# Patient Record
Sex: MALE | Race: OTHER | Employment: FULL TIME | URBAN - METROPOLITAN AREA
[De-identification: names, ages, dates, MRNs, and addresses within clinical notes are randomized per-mention and may not be internally consistent; named-entity substitution may affect disease eponyms.]

---

## 2018-09-19 ENCOUNTER — HOSPITAL ENCOUNTER (OUTPATIENT)
Dept: DIABETES SERVICES | Age: 58
Discharge: HOME | End: 2018-09-19
Payer: COMMERCIAL

## 2018-09-19 DIAGNOSIS — E11.9 DIABETES MELLITUS WITHOUT COMPLICATION (HCC): ICD-10-CM

## 2018-09-19 PROCEDURE — G0109 DIAB MANAGE TRN IND/GROUP: HCPCS

## 2018-10-02 ENCOUNTER — HOSPITAL ENCOUNTER (OUTPATIENT)
Dept: DIABETES SERVICES | Age: 58
Discharge: HOME | End: 2018-10-02
Payer: COMMERCIAL

## 2018-10-02 DIAGNOSIS — E11.9 DIABETES MELLITUS WITHOUT COMPLICATION (HCC): ICD-10-CM

## 2018-10-02 PROCEDURE — G0109 DIAB MANAGE TRN IND/GROUP: HCPCS | Performed by: DIETITIAN, REGISTERED

## 2018-11-15 ENCOUNTER — HOSPITAL ENCOUNTER (OUTPATIENT)
Dept: DIABETES SERVICES | Age: 58
Discharge: HOME | End: 2018-11-15
Payer: COMMERCIAL

## 2018-11-15 DIAGNOSIS — E11.9 DIABETES MELLITUS WITHOUT COMPLICATION (HCC): ICD-10-CM

## 2018-11-15 PROCEDURE — G0109 DIAB MANAGE TRN IND/GROUP: HCPCS

## 2019-04-04 ENCOUNTER — HOSPITAL ENCOUNTER (OUTPATIENT)
Dept: DIABETES SERVICES | Age: 59
Discharge: HOME | End: 2019-04-04
Payer: COMMERCIAL

## 2019-04-04 DIAGNOSIS — E11.9 DIABETES MELLITUS WITHOUT COMPLICATION (HCC): ICD-10-CM

## 2019-04-04 PROCEDURE — G0109 DIAB MANAGE TRN IND/GROUP: HCPCS | Performed by: DIETITIAN, REGISTERED

## 2019-04-04 NOTE — DIABETES MGMT
04/04/19    Dr. Dean Boswell,  Thank you for your kind referral. Since your patient, Alejandrina Arboleda completed their diabetes education classes six months ago, they were invited back today for an additional  session which included a virtual grocery store tour and tips on heart healthy eating at [facility].     Data from visit:    HgbA1c:  9/5/2018 6.9%  11/16/2018 6.%  4/4/2019 5.8%    Increased risk for diabetes: 5.7-6.4%   Diabetes:  >6.4%  Glycemic control for adults with diabetes: <7%  Elderly or multiple medical conditions: <8%      If you have any questions please do not hesitate to call the [facility] at [phone]      Sincerely,  Mable Griffin, [address], [address] [place] [address], [address] [place] [address], [address] [place] [address],[address], [address] [place] [address]  Phone: [phone] Fax : [phone]

## 2019-04-05 PROCEDURE — G0109 DIAB MANAGE TRN IND/GROUP: HCPCS | Performed by: DIETITIAN, REGISTERED

## 2019-10-31 ENCOUNTER — HOSPITAL ENCOUNTER (INPATIENT)
Age: 59
LOS: 4 days | Discharge: HOME | DRG: 863 | End: 2019-11-04
Attending: EMERGENCY MEDICINE | Admitting: HOSPITALIST
Payer: COMMERCIAL

## 2019-10-31 DIAGNOSIS — N39.0 URINARY TRACT INFECTION WITHOUT HEMATURIA, SITE UNSPECIFIED: Primary | ICD-10-CM

## 2019-10-31 PROBLEM — A41.9 SEPSIS (HCC): Status: ACTIVE | Noted: 2019-10-31

## 2019-10-31 LAB
ALBUMIN SERPL-MCNC: 4.1 G/DL (ref 3.5–5)
ALBUMIN/GLOB SERPL: 1.1 {RATIO} (ref 1.1–2.2)
ALP SERPL-CCNC: 87 U/L (ref 45–117)
ALT SERPL-CCNC: 32 U/L (ref 12–78)
ANION GAP SERPL CALC-SCNC: 7 MMOL/L (ref 5–15)
APPEARANCE UR: CLEAR
AST SERPL-CCNC: 15 U/L (ref 15–37)
BACTERIA URNS QL MICRO: NEGATIVE /HPF
BASOPHILS # BLD: 0.1 K/UL (ref 0–0.1)
BASOPHILS NFR BLD: 1 % (ref 0–1)
BILIRUB SERPL-MCNC: 0.8 MG/DL (ref 0.2–1)
BILIRUB UR QL: NEGATIVE
BUN SERPL-MCNC: 21 MG/DL (ref 6–20)
BUN/CREAT SERPL: 16 (ref 12–20)
CALCIUM SERPL-MCNC: 9.4 MG/DL (ref 8.5–10.1)
CHLORIDE SERPL-SCNC: 98 MMOL/L (ref 97–108)
CO2 SERPL-SCNC: 29 MMOL/L (ref 21–32)
COLOR UR: ABNORMAL
COMMENT, HOLDF: NORMAL
CREAT SERPL-MCNC: 1.32 MG/DL (ref 0.7–1.3)
DIFFERENTIAL METHOD BLD: ABNORMAL
EOSINOPHIL # BLD: 0.1 K/UL (ref 0–0.4)
EOSINOPHIL NFR BLD: 1 % (ref 0–7)
EPITH CASTS URNS QL MICRO: ABNORMAL /LPF
ERYTHROCYTE [DISTWIDTH] IN BLOOD BY AUTOMATED COUNT: 12.7 % (ref 11.5–14.5)
GLOBULIN SER CALC-MCNC: 3.8 G/DL (ref 2–4)
GLUCOSE SERPL-MCNC: 107 MG/DL (ref 65–100)
GLUCOSE UR STRIP.AUTO-MCNC: >1000 MG/DL
HCT VFR BLD AUTO: 58 % (ref 36.6–50.3)
HGB BLD-MCNC: 19.5 G/DL (ref 12.1–17)
HGB UR QL STRIP: ABNORMAL
IMM GRANULOCYTES # BLD AUTO: 0 K/UL (ref 0–0.04)
IMM GRANULOCYTES NFR BLD AUTO: 0 % (ref 0–0.5)
KETONES UR QL STRIP.AUTO: ABNORMAL MG/DL
LACTATE BLD-SCNC: 1.24 MMOL/L (ref 0.4–2)
LEUKOCYTE ESTERASE UR QL STRIP.AUTO: NEGATIVE
LYMPHOCYTES # BLD: 1 K/UL (ref 0.8–3.5)
LYMPHOCYTES NFR BLD: 9 % (ref 12–49)
MCH RBC QN AUTO: 30.4 PG (ref 26–34)
MCHC RBC AUTO-ENTMCNC: 33.6 G/DL (ref 30–36.5)
MCV RBC AUTO: 90.3 FL (ref 80–99)
MONOCYTES # BLD: 0.8 K/UL (ref 0–1)
MONOCYTES NFR BLD: 7 % (ref 5–13)
NEUTS SEG # BLD: 9.1 K/UL (ref 1.8–8)
NEUTS SEG NFR BLD: 82 % (ref 32–75)
NITRITE UR QL STRIP.AUTO: NEGATIVE
NRBC # BLD: 0 K/UL (ref 0–0.01)
NRBC BLD-RTO: 0 PER 100 WBC
PH UR STRIP: 5.5 [PH] (ref 5–8)
PLATELET # BLD AUTO: 136 K/UL (ref 150–400)
PMV BLD AUTO: 10.4 FL (ref 8.9–12.9)
POTASSIUM SERPL-SCNC: 3.5 MMOL/L (ref 3.5–5.1)
PROT SERPL-MCNC: 7.9 G/DL (ref 6.4–8.2)
PROT UR STRIP-MCNC: NEGATIVE MG/DL
RBC # BLD AUTO: 6.42 M/UL (ref 4.1–5.7)
RBC #/AREA URNS HPF: ABNORMAL /HPF (ref 0–5)
SAMPLES BEING HELD,HOLD: NORMAL
SODIUM SERPL-SCNC: 134 MMOL/L (ref 136–145)
SP GR UR REFRACTOMETRY: 1.01 (ref 1–1.03)
UA: UC IF INDICATED,UAUC: ABNORMAL
UROBILINOGEN UR QL STRIP.AUTO: 0.2 EU/DL (ref 0.2–1)
WBC # BLD AUTO: 11.1 K/UL (ref 4.1–11.1)
WBC URNS QL MICRO: ABNORMAL /HPF (ref 0–4)

## 2019-10-31 PROCEDURE — 87086 URINE CULTURE/COLONY COUNT: CPT

## 2019-10-31 PROCEDURE — 87077 CULTURE AEROBIC IDENTIFY: CPT

## 2019-10-31 PROCEDURE — 80053 COMPREHEN METABOLIC PANEL: CPT

## 2019-10-31 PROCEDURE — 74011000258 HC RX REV CODE- 258: Performed by: EMERGENCY MEDICINE

## 2019-10-31 PROCEDURE — 65660000000 HC RM CCU STEPDOWN

## 2019-10-31 PROCEDURE — 96374 THER/PROPH/DIAG INJ IV PUSH: CPT

## 2019-10-31 PROCEDURE — 74011250637 HC RX REV CODE- 250/637: Performed by: EMERGENCY MEDICINE

## 2019-10-31 PROCEDURE — 81001 URINALYSIS AUTO W/SCOPE: CPT

## 2019-10-31 PROCEDURE — 83605 ASSAY OF LACTIC ACID: CPT

## 2019-10-31 PROCEDURE — 74011250636 HC RX REV CODE- 250/636: Performed by: EMERGENCY MEDICINE

## 2019-10-31 PROCEDURE — 36415 COLL VENOUS BLD VENIPUNCTURE: CPT

## 2019-10-31 PROCEDURE — 99284 EMERGENCY DEPT VISIT MOD MDM: CPT

## 2019-10-31 PROCEDURE — 74011250636 HC RX REV CODE- 250/636: Performed by: HOSPITALIST

## 2019-10-31 PROCEDURE — 85025 COMPLETE CBC W/AUTO DIFF WBC: CPT

## 2019-10-31 PROCEDURE — 87186 SC STD MICRODIL/AGAR DIL: CPT

## 2019-10-31 PROCEDURE — 74011250637 HC RX REV CODE- 250/637: Performed by: HOSPITALIST

## 2019-10-31 PROCEDURE — 87040 BLOOD CULTURE FOR BACTERIA: CPT

## 2019-10-31 RX ORDER — PERPHENAZINE/AMITRIPTYLINE HCL 4 MG-25 MG
40 TABLET ORAL
COMMUNITY

## 2019-10-31 RX ORDER — ATORVASTATIN CALCIUM 20 MG/1
20 TABLET, FILM COATED ORAL DAILY
COMMUNITY

## 2019-10-31 RX ORDER — ATORVASTATIN CALCIUM 10 MG/1
20 TABLET, FILM COATED ORAL DAILY
Status: DISCONTINUED | OUTPATIENT
Start: 2019-11-01 | End: 2019-11-04 | Stop reason: HOSPADM

## 2019-10-31 RX ORDER — VANCOMYCIN/0.9 % SOD CHLORIDE 1.5G/250ML
1500 PLASTIC BAG, INJECTION (ML) INTRAVENOUS
Status: DISCONTINUED | OUTPATIENT
Start: 2019-11-01 | End: 2019-11-01 | Stop reason: ALTCHOICE

## 2019-10-31 RX ORDER — MAGNESIUM SULFATE 100 %
4 CRYSTALS MISCELLANEOUS AS NEEDED
Status: DISCONTINUED | OUTPATIENT
Start: 2019-10-31 | End: 2019-11-04 | Stop reason: HOSPADM

## 2019-10-31 RX ORDER — LEVOFLOXACIN 5 MG/ML
500 INJECTION, SOLUTION INTRAVENOUS EVERY 24 HOURS
Status: DISCONTINUED | OUTPATIENT
Start: 2019-10-31 | End: 2019-10-31

## 2019-10-31 RX ORDER — DEXTROSE MONOHYDRATE 100 MG/ML
0-250 INJECTION, SOLUTION INTRAVENOUS AS NEEDED
Status: DISCONTINUED | OUTPATIENT
Start: 2019-10-31 | End: 2019-11-04 | Stop reason: HOSPADM

## 2019-10-31 RX ORDER — ACETAMINOPHEN 325 MG/1
325 TABLET ORAL
COMMUNITY

## 2019-10-31 RX ORDER — ACETAMINOPHEN 325 MG/1
650 TABLET ORAL
Status: DISCONTINUED | OUTPATIENT
Start: 2019-10-31 | End: 2019-11-01

## 2019-10-31 RX ORDER — LEVOTHYROXINE SODIUM 75 UG/1
75 TABLET ORAL
COMMUNITY

## 2019-10-31 RX ORDER — MELATONIN
1000 EVERY EVENING
COMMUNITY

## 2019-10-31 RX ORDER — LEVOFLOXACIN 5 MG/ML
500 INJECTION, SOLUTION INTRAVENOUS EVERY 24 HOURS
Status: DISCONTINUED | OUTPATIENT
Start: 2019-11-01 | End: 2019-11-01

## 2019-10-31 RX ORDER — SODIUM CHLORIDE 9 MG/ML
150 INJECTION, SOLUTION INTRAVENOUS CONTINUOUS
Status: DISCONTINUED | OUTPATIENT
Start: 2019-10-31 | End: 2019-11-02

## 2019-10-31 RX ORDER — INDAPAMIDE 2.5 MG/1
2.5 TABLET, FILM COATED ORAL DAILY
COMMUNITY

## 2019-10-31 RX ORDER — SODIUM CHLORIDE 0.9 % (FLUSH) 0.9 %
5-40 SYRINGE (ML) INJECTION AS NEEDED
Status: DISCONTINUED | OUTPATIENT
Start: 2019-10-31 | End: 2019-11-04 | Stop reason: HOSPADM

## 2019-10-31 RX ORDER — METFORMIN HYDROCHLORIDE 500 MG/1
1000 TABLET, EXTENDED RELEASE ORAL 2 TIMES DAILY
COMMUNITY

## 2019-10-31 RX ORDER — ONDANSETRON 2 MG/ML
4 INJECTION INTRAMUSCULAR; INTRAVENOUS
Status: DISCONTINUED | OUTPATIENT
Start: 2019-10-31 | End: 2019-11-04 | Stop reason: HOSPADM

## 2019-10-31 RX ORDER — VANCOMYCIN 2 GRAM/500 ML IN 0.9 % SODIUM CHLORIDE INTRAVENOUS
2000 ONCE
Status: COMPLETED | OUTPATIENT
Start: 2019-10-31 | End: 2019-11-01

## 2019-10-31 RX ORDER — ACETAMINOPHEN 500 MG
1000 TABLET ORAL
Status: COMPLETED | OUTPATIENT
Start: 2019-10-31 | End: 2019-10-31

## 2019-10-31 RX ORDER — LEVOTHYROXINE SODIUM 150 UG/1
75 TABLET ORAL
Status: DISCONTINUED | OUTPATIENT
Start: 2019-11-01 | End: 2019-11-04 | Stop reason: HOSPADM

## 2019-10-31 RX ORDER — SODIUM CHLORIDE 0.9 % (FLUSH) 0.9 %
5-40 SYRINGE (ML) INJECTION EVERY 8 HOURS
Status: DISCONTINUED | OUTPATIENT
Start: 2019-10-31 | End: 2019-11-04 | Stop reason: HOSPADM

## 2019-10-31 RX ORDER — TADALAFIL 5 MG/1
5-20 TABLET ORAL
COMMUNITY

## 2019-10-31 RX ORDER — INSULIN LISPRO 100 [IU]/ML
INJECTION, SOLUTION INTRAVENOUS; SUBCUTANEOUS
Status: DISCONTINUED | OUTPATIENT
Start: 2019-10-31 | End: 2019-11-04 | Stop reason: HOSPADM

## 2019-10-31 RX ADMIN — Medication 2 CAPSULE: at 23:12

## 2019-10-31 RX ADMIN — SODIUM CHLORIDE 150 ML/HR: 900 INJECTION, SOLUTION INTRAVENOUS at 22:20

## 2019-10-31 RX ADMIN — VANCOMYCIN HYDROCHLORIDE 2000 MG: 10 INJECTION, POWDER, LYOPHILIZED, FOR SOLUTION INTRAVENOUS at 22:19

## 2019-10-31 RX ADMIN — ACETAMINOPHEN 1000 MG: 500 TABLET ORAL at 20:24

## 2019-10-31 RX ADMIN — Medication 10 ML: at 23:12

## 2019-10-31 RX ADMIN — SODIUM CHLORIDE 1000 ML: 900 INJECTION, SOLUTION INTRAVENOUS at 20:24

## 2019-10-31 RX ADMIN — CEFTRIAXONE 1 G: 1 INJECTION, POWDER, FOR SOLUTION INTRAMUSCULAR; INTRAVENOUS at 21:23

## 2019-10-31 NOTE — ED PROVIDER NOTES
63 y.o. male with past medical history significant for diabetes, migraines, and elevated PSA who presents from home via personal vehicle with their spouse with chief complaint of generalized body aches. Pt underwent a prostate biopsy (via rectum, under local anesthetic) performed by Dr. Dwight Gardner. Today at 4:30pm, pt began feeling chills, back pain and a sense of urgency when needing to urinate. Pt was put on Cipro and Cefdinir post-procedure and has been taking them as prescribed. Pt last took tylenol at 2pm today. Patient affirms non-productive cough, chills and back pain. Pt denies urinary symptoms, vomiting and chest pain. There are no other acute medical concerns at this time. Social hx: Never smoker  PCP: MD Dr. Dwight Coats: [phone]    Note written by Dayanara Curiel, as dictated by Johana Atwood MD 7:14 PM            The history is provided by the patient. No  was used. Past Medical History:   Diagnosis Date    Diabetes ([facility] [address]     Elevated PSA     Migraines        Past Surgical History:   Procedure Laterality Date    HX HERNIA REPAIR      HX SEPTOPLASTY           History reviewed. No pertinent family history.     Social History     Socioeconomic History    Marital status:      Spouse name: Not on file    Number of children: Not on file    Years of education: Not on file    Highest education level: Not on file   Occupational History    Not on file   Social Needs    Financial resource strain: Not on file    Food insecurity:     Worry: Not on file     Inability: Not on file    Transportation needs:     Medical: Not on file     Non-medical: Not on file   Tobacco Use    Smoking status: Never Smoker    Smokeless tobacco: Never Used   Substance and Sexual Activity    Alcohol use: Yes     Comment: Social    Drug use: Not Currently    Sexual activity: Not on file   Lifestyle    Physical activity:     Days per week: Not on file     Minutes per session: Not on file    Stress: Not on file   Relationships    Social connections:     Talks on phone: Not on file     Gets together: Not on file     Attends Tenriism service: Not on file     Active member of club or organization: Not on file     Attends meetings of clubs or organizations: Not on file     Relationship status: Not on file    Intimate partner violence:     Fear of current or ex partner: Not on file     Emotionally abused: Not on file     Physically abused: Not on file     Forced sexual activity: Not on file   Other Topics Concern    Not on file   Social History Narrative    Not on file         ALLERGIES: Patient has no known allergies. Review of Systems   Constitutional: Positive for chills. Negative for fever. Eyes: Negative for visual disturbance. Respiratory: Positive for cough. Negative for wheezing. Cardiovascular: Negative for leg swelling. Gastrointestinal: Negative for abdominal pain, nausea and vomiting. Endocrine: Negative for polyuria. Genitourinary: Positive for urgency. Negative for difficulty urinating, dysuria, frequency and hematuria. Musculoskeletal: Positive for back pain. Negative for neck stiffness. Neurological: Negative. Negative for syncope. Psychiatric/Behavioral: Negative for confusion. All other systems reviewed and are negative. Vitals:    10/31/19 1901   BP: (!) 153/94   Pulse: (!) 124   Resp: 20   Temp: (!) 101.5 °F (38.6 °C)   SpO2: 98%   Weight: 98.4 kg (217 lb)   Height: 6' 1\" (1.854 m)            Physical Exam   Constitutional: He appears well-developed and well-nourished. No distress. HENT:   Head: Normocephalic. Eyes: Pupils are equal, round, and reactive to light. Neck: Normal range of motion. Cardiovascular: Normal rate and regular rhythm. No murmur heard. Pulmonary/Chest: Effort normal and breath sounds normal. No respiratory distress. Abdominal: Soft. There is no tenderness.    Genitourinary: Musculoskeletal: Normal range of motion. He exhibits no edema. Neurological: He is alert. He has normal strength. No cranial nerve deficit. Skin: Skin is warm and dry. Psychiatric: He has a normal mood and affect. His behavior is normal.   Nursing note and vitals reviewed. Note written by Dayanara Rebolledo, as dictated by Tor Garcia MD 7:14 PM      MDM       Procedures    CONSULT NOTE:  9:17 PM Tor Garcia MD spoke with Dr. Kearns, Consult for Urology. Discussed available diagnostic tests and clinical findings.   Dr. Kearns recommends the patient be admitted to the hospital.

## 2019-10-31 NOTE — ED TRIAGE NOTES
Triage:  Pt to ED from home due to worsening chills, and body aches. Pt states yesterday he had a prostate biopsy and called his urologist and was referred to ED due to concerning findings of infection.

## 2019-11-01 LAB
ANION GAP SERPL CALC-SCNC: 8 MMOL/L (ref 5–15)
BUN SERPL-MCNC: 16 MG/DL (ref 6–20)
BUN/CREAT SERPL: 14 (ref 12–20)
CALCIUM SERPL-MCNC: 8.6 MG/DL (ref 8.5–10.1)
CHLORIDE SERPL-SCNC: 103 MMOL/L (ref 97–108)
CO2 SERPL-SCNC: 23 MMOL/L (ref 21–32)
CREAT SERPL-MCNC: 1.14 MG/DL (ref 0.7–1.3)
ERYTHROCYTE [DISTWIDTH] IN BLOOD BY AUTOMATED COUNT: 12.4 % (ref 11.5–14.5)
GLUCOSE BLD STRIP.AUTO-MCNC: 106 MG/DL (ref 65–100)
GLUCOSE BLD STRIP.AUTO-MCNC: 107 MG/DL (ref 65–100)
GLUCOSE BLD STRIP.AUTO-MCNC: 110 MG/DL (ref 65–100)
GLUCOSE BLD STRIP.AUTO-MCNC: 125 MG/DL (ref 65–100)
GLUCOSE SERPL-MCNC: 127 MG/DL (ref 65–100)
HCT VFR BLD AUTO: 55.2 % (ref 36.6–50.3)
HGB BLD-MCNC: 18.8 G/DL (ref 12.1–17)
MAGNESIUM SERPL-MCNC: 1.5 MG/DL (ref 1.6–2.4)
MCH RBC QN AUTO: 30.6 PG (ref 26–34)
MCHC RBC AUTO-ENTMCNC: 34.1 G/DL (ref 30–36.5)
MCV RBC AUTO: 89.8 FL (ref 80–99)
NRBC # BLD: 0 K/UL (ref 0–0.01)
NRBC BLD-RTO: 0 PER 100 WBC
PHOSPHATE SERPL-MCNC: 2.5 MG/DL (ref 2.6–4.7)
PLATELET # BLD AUTO: 124 K/UL (ref 150–400)
PMV BLD AUTO: 10.6 FL (ref 8.9–12.9)
POTASSIUM SERPL-SCNC: 3.2 MMOL/L (ref 3.5–5.1)
RBC # BLD AUTO: 6.15 M/UL (ref 4.1–5.7)
SERVICE CMNT-IMP: ABNORMAL
SODIUM SERPL-SCNC: 134 MMOL/L (ref 136–145)
WBC # BLD AUTO: 12.2 K/UL (ref 4.1–11.1)

## 2019-11-01 PROCEDURE — 82962 GLUCOSE BLOOD TEST: CPT

## 2019-11-01 PROCEDURE — 80048 BASIC METABOLIC PNL TOTAL CA: CPT

## 2019-11-01 PROCEDURE — 36415 COLL VENOUS BLD VENIPUNCTURE: CPT

## 2019-11-01 PROCEDURE — 85027 COMPLETE CBC AUTOMATED: CPT

## 2019-11-01 PROCEDURE — 74011250636 HC RX REV CODE- 250/636: Performed by: NURSE PRACTITIONER

## 2019-11-01 PROCEDURE — 74011000258 HC RX REV CODE- 258: Performed by: HOSPITALIST

## 2019-11-01 PROCEDURE — 84100 ASSAY OF PHOSPHORUS: CPT

## 2019-11-01 PROCEDURE — 74011250637 HC RX REV CODE- 250/637: Performed by: UROLOGY

## 2019-11-01 PROCEDURE — 74011250636 HC RX REV CODE- 250/636: Performed by: HOSPITALIST

## 2019-11-01 PROCEDURE — 83735 ASSAY OF MAGNESIUM: CPT

## 2019-11-01 PROCEDURE — 74011250637 HC RX REV CODE- 250/637: Performed by: HOSPITALIST

## 2019-11-01 PROCEDURE — 65660000000 HC RM CCU STEPDOWN

## 2019-11-01 RX ORDER — LANOLIN ALCOHOL/MO/W.PET/CERES
400 CREAM (GRAM) TOPICAL DAILY
Status: DISCONTINUED | OUTPATIENT
Start: 2019-11-01 | End: 2019-11-04 | Stop reason: HOSPADM

## 2019-11-01 RX ORDER — POTASSIUM CHLORIDE 750 MG/1
40 TABLET, FILM COATED, EXTENDED RELEASE ORAL
Status: COMPLETED | OUTPATIENT
Start: 2019-11-01 | End: 2019-11-01

## 2019-11-01 RX ORDER — POTASSIUM CHLORIDE 7.45 MG/ML
10 INJECTION INTRAVENOUS
Status: COMPLETED | OUTPATIENT
Start: 2019-11-01 | End: 2019-11-01

## 2019-11-01 RX ORDER — MAGNESIUM SULFATE 1 G/100ML
1 INJECTION INTRAVENOUS
Status: COMPLETED | OUTPATIENT
Start: 2019-11-01 | End: 2019-11-01

## 2019-11-01 RX ORDER — ACETAMINOPHEN 325 MG/1
650 TABLET ORAL
Status: DISCONTINUED | OUTPATIENT
Start: 2019-11-01 | End: 2019-11-04 | Stop reason: HOSPADM

## 2019-11-01 RX ADMIN — POTASSIUM CHLORIDE 10 MEQ: 10 INJECTION, SOLUTION INTRAVENOUS at 07:03

## 2019-11-01 RX ADMIN — POTASSIUM CHLORIDE 10 MEQ: 10 INJECTION, SOLUTION INTRAVENOUS at 04:53

## 2019-11-01 RX ADMIN — ACETAMINOPHEN 650 MG: 325 TABLET, FILM COATED ORAL at 11:54

## 2019-11-01 RX ADMIN — ACETAMINOPHEN 650 MG: 325 TABLET, FILM COATED ORAL at 07:22

## 2019-11-01 RX ADMIN — MEROPENEM 500 MG: 500 INJECTION, POWDER, FOR SOLUTION INTRAVENOUS at 17:40

## 2019-11-01 RX ADMIN — Medication 10 ML: at 06:04

## 2019-11-01 RX ADMIN — Medication 2 CAPSULE: at 22:14

## 2019-11-01 RX ADMIN — SODIUM CHLORIDE 150 ML/HR: 900 INJECTION, SOLUTION INTRAVENOUS at 07:03

## 2019-11-01 RX ADMIN — LEVOFLOXACIN 500 MG: 5 INJECTION, SOLUTION INTRAVENOUS at 09:00

## 2019-11-01 RX ADMIN — SODIUM CHLORIDE 150 ML/HR: 900 INJECTION, SOLUTION INTRAVENOUS at 14:19

## 2019-11-01 RX ADMIN — SODIUM CHLORIDE 150 ML/HR: 900 INJECTION, SOLUTION INTRAVENOUS at 21:53

## 2019-11-01 RX ADMIN — ACETAMINOPHEN 650 MG: 325 TABLET, FILM COATED ORAL at 19:25

## 2019-11-01 RX ADMIN — MAGNESIUM SULFATE HEPTAHYDRATE 1 G: 1 INJECTION, SOLUTION INTRAVENOUS at 04:54

## 2019-11-01 RX ADMIN — ATORVASTATIN CALCIUM 20 MG: 20 TABLET, FILM COATED ORAL at 09:00

## 2019-11-01 RX ADMIN — PIPERACILLIN AND TAZOBACTAM 3.38 G: 3; .375 INJECTION, POWDER, FOR SOLUTION INTRAVENOUS at 14:22

## 2019-11-01 RX ADMIN — Medication 10 ML: at 21:47

## 2019-11-01 RX ADMIN — POTASSIUM CHLORIDE 10 MEQ: 10 INJECTION, SOLUTION INTRAVENOUS at 06:03

## 2019-11-01 RX ADMIN — LEVOTHYROXINE SODIUM 75 MCG: 75 TABLET ORAL at 07:03

## 2019-11-01 RX ADMIN — VANCOMYCIN HYDROCHLORIDE 1500 MG: 10 INJECTION, POWDER, LYOPHILIZED, FOR SOLUTION INTRAVENOUS at 14:18

## 2019-11-01 RX ADMIN — MAGNESIUM SULFATE HEPTAHYDRATE 1 G: 1 INJECTION, SOLUTION INTRAVENOUS at 03:58

## 2019-11-01 RX ADMIN — POTASSIUM CHLORIDE 40 MEQ: 750 TABLET, FILM COATED, EXTENDED RELEASE ORAL at 10:40

## 2019-11-01 RX ADMIN — Medication 10 ML: at 14:30

## 2019-11-01 RX ADMIN — MEROPENEM 500 MG: 500 INJECTION, POWDER, FOR SOLUTION INTRAVENOUS at 21:47

## 2019-11-01 RX ADMIN — MAGNESIUM OXIDE TAB 400 MG (241.3 MG ELEMENTAL MG) 400 MG: 400 (241.3 MG) TAB at 10:40

## 2019-11-01 RX ADMIN — POTASSIUM CHLORIDE 10 MEQ: 10 INJECTION, SOLUTION INTRAVENOUS at 03:58

## 2019-11-01 NOTE — CONSULTS
Requesting Provider: Indio Reed MD - Reason for Consultation: \"sepsis\"  Pre-existing [facility] Patient:   Yes, Dr. Chavez Martinez                Patient: Miesha Hartman MRN: [id number]  SSN: [id number]    YOB: 1960  Age: 63 y.o. Sex: male     Location: [facility]       Code Status: Full Code   PCP: Sheryl Lawrence MD  - [phone]   Emergency Contact:  Primary Emergency Contact: Megan Holliday   Race/Holiness/Language: WHITE OR  / [facility] / Speaks ENGLISH   Payor: Payor: Cotton Frees / Plan: [employer]zekendall Joseph[employer] PPO / Product Type: PPO /    Prior Admission Data:         Hospitalized:  Hospital Day: 2 - Admitted 10/31/2019  7:05 PM     CONSULTANTS  IP CONSULT TO UROLOGY  IP CONSULT TO HOSPITALIST   ADMISSION DIAGNOSES    ICD-10-CM ICD-9-CM   1. Urinary tract infection without hematuria, site unspecified N39.0 599.0         Assessment/Plan:       · Sepsis, BCX-GNR +, 1/4 bottles, following prostate biopsy on 10/30/2019. · UTI? ucx pending     -consider meropenem or ertapenem   -follow blood and urine cultures  -following     CC: Chills and Generalized Body Aches   HPI: He is a 63 y.o. male admitted 2 days ago w/ fever, chills, body aches. T-101 @ home. PMHx of elevated age-adjusted PSA so he underwent transrectal prostate biopsy on 10/30/2019 by Dr. Chavez Martinez. Last OV note included below. He was prescribed ciprofloxacin and cefdinir pre and post-procedure and was taking them as instructed. Assiciated symptoms include dysuria, headache. He has been started on Levaquin and Vancomycin. BCX + for GNR in 1/4 bottles. UCX pending. Temp-102. Reports occasional dysuria. He feels he has no issue emptying bladder. WBC-12.2  BUN/Creat-WNL         Problem: sepsis;  Location: bladder?;  Severity: moderate; Timing:sudden onset of fever, Context: as above; Better/Worse: tylenol, abx, Associated s/s:dysuria, fever, chills     __________________________________________  OV note from 10/26/2019-Dr. Chavez Bingham Carmella Devi returns to the office for follow-up regarding his history of ED and elevated age adjusted PSA. We tried generic sildenafil for his erections, but patient has not had good results. He admits to taking medication on an empty stomach. PSA on 06/10/2109 was slightly elevated at 4.01. He had a repeat PSA checked and it came back at 3. PSA 3 months later was 3.3, see below. We discussed that it is still elevated for his age and a prostate biopsy was recommended. He experiences nocturia 1-2x nightly. He denies any voiding complaints. He denies any hematuria, dysuria, fever, or chills. Urine today is clear. PAST MEDICAL HISTORY:    Allergies: * DUST / DUST MITE (Severe)  * MILDEW (Severe)  * HOUSE DUST (Severe)  * BLACK PEPPER (Severe)  DENIES: Latex, Shellfish, X-Ray Dye, Iodine. Medications: TADALAFIL 5 MG ORAL TABLET (TADALAFIL) 1-4 tablet by mouth daily as needed; Route: ORAL  ATORVASTATIN CALCIUM 20 MG ORAL TABLET (ATORVASTATIN CALCIUM) Take 1 tablet daily; Route: ORAL  SILDENAFIL CITRATE 20 MG ORAL TABLET (SILDENAFIL CITRATE) Take 1 to 5 tablets daily prn; Route: ORAL  INDAPAMIDE 2.5 MG ORAL TABLET (INDAPAMIDE) daily; Route: ORAL  LEVOXYL 75 MCG ORAL TABLET (LEVOTHYROXINE SODIUM) daily; Route: ORAL  METFORMIN HCL 1000 MG ORAL TABLET (METFORMIN HCL) 2x daily = 2000mg; Route: ORAL  JARDIANCE 25 MG ORAL TABLET (EMPAGLIFLOZIN) daily; Route: ORAL    Illnesses: Diabetes. DENIES: Heart Disease, Pacemaker/Defibrillator, Lung Disease, High Blood Pressure, Bowel Problems, Stroke/Seizure, Kidney Problems, Bleeding Problems, HIV, Hepatitis, or Cancer. Surgeries: Other Abdominal Surgery. Family History: DENIES: Prostate cancer, Kidney cancer, Kidney disease, Kidney stones. Social History: Full Time - . Other. Smoking status: never smoker. Drinks alcohol 2 to 4 times a month.      System Review: DENIES: Unexplained Weight Loss, Dry Eyes, Dry Mouth, Leg Swelling, Shortness of Breath, Constipation, Involuntary Urine Loss, Lower Extremity Weakness, Dry Skin, Difficulty Walking, Psychiatric Problems, Impaired Sex Drive, Easy Bleeding, Rash. EXAMINATION: Appearance: well-developed NAD Respiratory Effort: breathing easily Skin Inspection: warm and dry Orientation: oriented to person; time and place Mood/Affect: normal       URINALYSIS from Voided specimen  Urine Dip: pH: 5.0, Bld: Neg, LE: Neg, Nit: Neg, Prot: Neg, Ket: Neg, Gluc: Neg  Urine Micro not done    PSA HISTORY  3.30 ng/ml on 2019  3.00 ng/ml on 2019  4.01 ng/ml on 06/10/2019    Prescription(s) Today: TADALAFIL 5 MG ORAL TABLET (TADALAFIL) 1-4 tablet by mouth daily as needed  #90 x 5,  2019, Ximena Bennett RN    IMPRESSION:    1. ELEVATED AGE ADJUSTED PSA (CKL63-R80.20) - Deteriorated    2. ERECTILE DYSFUNCTION (VKB90-F28.9) - Unchanged    3. BPH W/O URINARY OBST (XXA15-I43.0) - Unchanged    PLAN: He will follow up in, sooner with any questions or difficulties. cc: Luis Carlos Garcias MD  Transcribed by Speech to Text Technology  Today's Services  E&M Service, Urinalysis Dipstick      By signing my name below, Laura Pierre. Mays, attest that this documentation has been prepared under the direct and in the presence of Alek Donis MD, MD.  Electronically Signed: Dayanara Kimbrough  2019  10:28 AM    I, Dr. Alek Donis, personally performed the services described in this documentation. All medical record entries made by the angelibruperto were at my direction and in my presence. I have reviewed the chart and discharge instructions and agree that the record reflects my personal performance and is accurate and complete.        Electronically signed by Alek Donis MD on 10/26/2019 at 10:00 AM    ________________________________________________________________________      Temp (24hrs), Av.2 °F (37.9 °C), Min:99.3 °F (37.4 °C), Max:101.5 °F (38.6 °C)    Urinary Status: Voiding, Bathroom privileges  Creatinine   Date/Time Value Ref Range Status   11/01/2019 12:18 AM 1.14 0.70 - 1.30 MG/DL Final   10/31/2019 07:39 PM 1.32 (H) 0.70 - 1.30 MG/DL Final     Current Antimicrobial Therapy (168h ago, onward)    Ordered     Start Stop    10/31/19 2208  vancomycin (VANCOCIN) 1500 mg in  ml infusion  1,500 mg,   IntraVENous,   EVERY 16 HOURS      11/01/19 1500 --    10/31/19 2133  levoFLOXacin (LEVAQUIN) 500 mg in D5W IVPB  500 mg,   IntraVENous,   EVERY 24 HOURS      11/01/19 0900 --    10/31/19 2203  Vancomycin - pharmacy to dose  Other,   RX DOSING/MONITORING      10/31/19 2203 --        Key Anti-Platelet Anticoagulant Meds     The patient is on no antiplatelet meds or anticoagulants.         Diet: DIET DIABETIC CONSISTENT CARB Regular -       Labs     Lab Results   Component Value Date/Time    WBC 12.2 (H) 11/01/2019 12:18 AM    HCT 55.2 (H) 11/01/2019 12:18 AM    PLATELET 216 (L) 57/42/6756 12:18 AM    Sodium 134 (L) 11/01/2019 12:18 AM    Potassium 3.2 (L) 11/01/2019 12:18 AM    Chloride 103 11/01/2019 12:18 AM    CO2 23 11/01/2019 12:18 AM    BUN 16 11/01/2019 12:18 AM    Creatinine 1.14 11/01/2019 12:18 AM    Glucose 127 (H) 11/01/2019 12:18 AM    Calcium 8.6 11/01/2019 12:18 AM    Magnesium 1.5 (L) 11/01/2019 12:18 AM     UA:   Lab Results   Component Value Date/Time    Color YELLOW/STRAW 10/31/2019 07:39 PM    Appearance CLEAR 10/31/2019 07:39 PM    Specific gravity 1.015 10/31/2019 07:39 PM    pH (UA) 5.5 10/31/2019 07:39 PM    Protein NEGATIVE  10/31/2019 07:39 PM    Glucose >1,000 (A) 10/31/2019 07:39 PM    Ketone TRACE (A) 10/31/2019 07:39 PM    Bilirubin NEGATIVE  10/31/2019 07:39 PM    Urobilinogen 0.2 10/31/2019 07:39 PM    Nitrites NEGATIVE  10/31/2019 07:39 PM    Leukocyte Esterase NEGATIVE  10/31/2019 07:39 PM    Epithelial cells FEW 10/31/2019 07:39 PM    Bacteria NEGATIVE  10/31/2019 07:39 PM    WBC 20-50 10/31/2019 07:39 PM    RBC 10-20 10/31/2019 07:39 PM     Imaging     No results found for this or any previous visit. US Results (most recent):  No results found for this or any previous visit.     Cultures     All Micro Results     Procedure Component Value Units Date/Time    CULTURE, BLOOD, PAIRED [[id number]]  (Abnormal) Collected:  10/31/19 1943    Order Status:  Completed Specimen:  Blood Updated:  11/01/19 0997     Special Requests: NO SPECIAL REQUESTS        Culture result:       GRAM NEGATIVE RODS GROWING IN 1 OF 4 BOTTLES DRAWN (SITE = R AC)                  REMAINING BOTTLE(S) HAS/HAVE NO GROWTH SO FAR          CULTURE, URINE [[id number]] Collected:  10/31/19 1939    Order Status:  Completed Updated:  10/31/19 2305           Past History: (Complete 2+/3 areas)   No Known Allergies   Current Facility-Administered Medications   Medication Dose Route Frequency    potassium chloride SR (KLOR-CON 10) tablet 40 mEq  40 mEq Oral NOW    magnesium oxide (MAG-OX) tablet 400 mg  400 mg Oral DAILY    sodium chloride (NS) flush 5-40 mL  5-40 mL IntraVENous Q8H    sodium chloride (NS) flush 5-40 mL  5-40 mL IntraVENous PRN    acetaminophen (TYLENOL) tablet 650 mg  650 mg Oral Q4H PRN    ondansetron (ZOFRAN) injection 4 mg  4 mg IntraVENous Q4H PRN    0.9% sodium chloride infusion  150 mL/hr IntraVENous CONTINUOUS    levoFLOXacin (LEVAQUIN) 500 mg in D5W IVPB  500 mg IntraVENous Q24H    atorvastatin (LIPITOR) tablet 20 mg  20 mg Oral DAILY    fish oil-omega-3 fatty acids 340-1,000 mg capsule 2 Cap  2 Cap Oral BID    levothyroxine (SYNTHROID) tablet 75 mcg  75 mcg Oral ACB    insulin lispro (HUMALOG) injection   SubCUTAneous AC&HS    glucose chewable tablet 16 g  4 Tab Oral PRN    glucagon (GLUCAGEN) injection 1 mg  1 mg IntraMUSCular PRN    dextrose 10% infusion 0-250 mL  0-250 mL IntraVENous PRN    Vancomycin - pharmacy to dose   Other Rx Dosing/Monitoring    vancomycin (VANCOCIN) 1500 mg in  ml infusion  1,500 mg IntraVENous Q16H    Prior to Admission medications Medication Sig Start Date End Date Taking? Authorizing Provider   atorvastatin (LIPITOR) 20 mg tablet Take 20 mg by mouth daily. Yes Provider, Historical   empagliflozin (JARDIANCE) 25 mg tablet Take 25 mg by mouth daily. Yes Provider, Historical   indapamide (LOZOL) 2.5 mg tablet Take 2.5 mg by mouth daily. Yes Provider, Historical   levothyroxine (SYNTHROID) 75 mcg tablet Take 75 mcg by mouth Daily (before breakfast). Yes Provider, Historical   metFORMIN ER (GLUCOPHAGE XR) 500 mg tablet Take 1,000 mg by mouth two (2) times a day. Yes Provider, Historical   tadalafil (CIALIS) 5 mg tablet Take 5-20 mg by mouth daily as needed. Yes Provider, Historical   docosahexanoic acid/epa (FISH OIL PO) Take 2 Caps by mouth two (2) times a day. Yes Provider, Historical   cholecalciferol (VITAMIN D3) (1000 Units /25 mcg) tablet Take 1,000 Units by mouth every evening. Yes Provider, Historical   zinc 50 mg tab tablet Take 25 mg by mouth two (2) times a day. Yes Provider, Historical   lutein 40 mg cap Take 40 mg by mouth nightly. Yes Provider, Historical   acetaminophen (TYLENOL) 325 mg tablet Take 325 mg by mouth every four (4) hours as needed for Pain. Yes Provider, Historical        PMHx:  has a past medical history of Diabetes ([address] Elevated PSA, and Migraines. PSurgHx:  has a past surgical history that includes hx septoplasty and hx hernia repair. PSocHx:  reports that he has never smoked. He has never used smokeless tobacco. He reports that he drinks alcohol. He reports that he has current or past drug history.    ROS:  (Complete - 10 systems) - DENIES: Weightloss (Constitutional), Dry mouth (ENMT), Chest pain (CV), SOB (Respiratory), Constipation (GI), Weakness (MS), Pallor (Skin), TIA Sx (Neuro), Confusion (Psych), Easy bruising (Heme)    Physical Exam: (Comprehesive - 8+ 1995 Systems)     (1) Constitutional:  FIO2:   on SpO2: O2 Sat (%): 94 %  O2 Device: Room air    Patient Vitals for the past 24 hrs:   BP Temp Pulse Resp SpO2 Height Weight   11/01/19 0840 (P) 155/83  (P) 86       11/01/19 0710 144/83 (!) 100.5 °F (38.1 °C) (!) 118 22 94 %     11/01/19 0454 149/90  (!) 111       11/01/19 0342 124/73 99.3 °F (37.4 °C) (!) 111 16 95 %  100.7 kg (222 lb)   10/31/19 2243 127/73 99.7 °F (37.6 °C) (!) 124 17 94 %  100.8 kg (222 lb 4.8 oz)   10/31/19 2200 133/78 100 °F (37.8 °C) (!) 126 19 93 %     10/31/19 2100 153/81 100.3 °F (37.9 °C) (!) 122 18 94 %     10/31/19 2030 (!) 161/91    94 %     10/31/19 1901 (!) 153/94 (!) 101.5 °F (38.6 °C) (!) 124 20 98 % 6' 1\" (1.854 m) 98.4 kg (217 lb)       Date 10/31/19 0700 - 11/01/19 0659 11/01/19 0700 - 11/02/19 0659   Shift 7501-3984 3969-7050 24 Hour Total 9860-6479 3271-2809 24 Hour Total   INTAKE   I.V.  1732.5(1.4) 1732.5(0.7)        Volume (0.9% sodium chloride infusion)  982.5 982.5        Volume (cefTRIAXone (ROCEPHIN) 1 g in 0.9% sodium chloride (MBP/ADV) 50 mL)  50 50        Volume (cefTRIAXone (ROCEPHIN) 1 g in 0.9% sodium chloride (MBP/ADV) 50 mL)  0 0        Volume (levoFLOXacin (LEVAQUIN) 500 mg in D5W IVPB)  0 0        Volume (levoFLOXacin (LEVAQUIN) 500 mg in D5W IVPB)  0 0        Volume (vancomycin (VANCOCIN) 2000 mg in  ml infusion)  500 500        Volume (vancomycin (VANCOCIN) 1500 mg in  ml infusion)  0 0        Volume (magnesium sulfate 1 g/100 ml IVPB (premix or compounded))  100 100        Volume (potassium chloride 10 mEq in 100 ml IVPB)  100 100      Shift Total(mL/kg)  1732.5(17.2) 1732.5(17.2)      OUTPUT   Urine  1170(1) 1170(0.5)        Urine Voided  1170 1170        Urine Occurrence(s)  1 x 1 x      Shift Total(mL/kg)  1170(11.6) 1170(11.6)      NET  562.5 562.5      Weight (kg)  100.7 100.7 100.7 100.7 100.7      (2) ENMT:   moist mucous membranes, normal sinuses   (3) Respiratory:  breathing easily, no distress   (4) GI:  no abdominal masses, tenderness   (5) :   Normal, urine clear/yellow   (6) Lymphatic: no adenopathy, neck supple   (7) Muscloskeletal:  no gross deformity, normal ROM   (8) Skin:  no rash, warm & dry   (9) Neuro:  no focal deficits, normal speech      Signed By: Pamela Sneed NP  - November 1, 2019

## 2019-11-01 NOTE — PROGRESS NOTES
Admission Medication Reconciliation:    Information obtained from:  Patient  RxQuery data available¹:  YES    Comments/Recommendations: Spoke with patient with wife present with patient's permission regarding use of PTA medications including prescription/OTC, vitamins/supplements, inhaled, topical, nasal, injectable, otic and ophthalmic medications. Patient was a good historian. Updated PTA meds/reviewed patient's allergies. Medication changes (since last review): Added  - All medications were added    Adjusted  - None    Removed  - None     ¹RxQuery pharmacy benefit data reflects medications filled and processed through the patient's insurance, however   this data does NOT capture whether the medication was picked up or is currently being taken by the patient. Allergies:  Patient has no known allergies. Significant PMH/Disease States:   Past Medical History:   Diagnosis Date    Diabetes ([address]     Elevated PSA     Migraines      Chief Complaint for this Admission:    Chief Complaint   Patient presents with    Chills    Generalized Body Aches     Prior to Admission Medications:   Prior to Admission Medications   Prescriptions Last Dose Informant Patient Reported? Taking?   acetaminophen (TYLENOL) 325 mg tablet 10/31/2019 at Unknown time  Yes Yes   Sig: Take 325 mg by mouth every four (4) hours as needed for Pain. atorvastatin (LIPITOR) 20 mg tablet 10/31/2019 at AM  Yes Yes   Sig: Take 20 mg by mouth daily. cholecalciferol (VITAMIN D3) (1000 Units /25 mcg) tablet 10/30/2019 at Unknown time  Yes Yes   Sig: Take 1,000 Units by mouth every evening. docosahexanoic acid/epa (FISH OIL PO) 10/31/2019 at Unknown time  Yes Yes   Sig: Take 2 Caps by mouth two (2) times a day. empagliflozin (JARDIANCE) 25 mg tablet 10/31/2019 at AM  Yes Yes   Sig: Take 25 mg by mouth daily. indapamide (LOZOL) 2.5 mg tablet 10/31/2019 at AM  Yes Yes   Sig: Take 2.5 mg by mouth daily.    levothyroxine (SYNTHROID) 75 mcg tablet 10/31/2019 at AM  Yes Yes   Sig: Take 75 mcg by mouth Daily (before breakfast). lutein 40 mg cap 10/30/2019 at Unknown time  Yes Yes   Sig: Take 40 mg by mouth nightly. metFORMIN ER (GLUCOPHAGE XR) 500 mg tablet 10/31/2019 at AM  Yes Yes   Sig: Take 1,000 mg by mouth two (2) times a day. tadalafil (CIALIS) 5 mg tablet   Yes Yes   Sig: Take 5-20 mg by mouth daily as needed. zinc 50 mg tab tablet 10/31/2019 at Unknown time  Yes Yes   Sig: Take 25 mg by mouth two (2) times a day. Facility-Administered Medications: None       Please contact the main inpatient pharmacy with any questions or concerns at [phone] and we will direct you to the clinical pharmacist covering this patient's care while in-house.    CURTIS Khan

## 2019-11-01 NOTE — PROGRESS NOTES
Bedside and Verbal shift change report given to [address] [place] [address] (oncoming nurse) by Melissa Mclaughlin (offgoing nurse). Report included the following information SBAR, Kardex, ED Summary, Intake/Output, MAR, Accordion, Recent Results and Cardiac Rhythm ST.       0300: Paged hospitalist regarding patient's K-value of 3.2, Mag of 1.5, and Phos of 2.5. Orders received for Mag and K. Primary Nurse Aggie Urbano and Michelle Bliss RN performed a dual skin assessment on this patient No impairment noted  Soto score is 22    TRANSFER - IN REPORT:    Verbal report received from Tyron (name) on Nirmala Mckeon  being received from ED(unit) for routine progression of care      Report consisted of patients Situation, Background, Assessment and   Recommendations(SBAR). Information from the following report(s) SBAR, ED Summary, Procedure Summary, Intake/Output, MAR, Accordion and Cardiac Rhythm ST was reviewed with the receiving nurse. Opportunity for questions and clarification was provided. Assessment completed upon patients arrival to unit and care assumed.

## 2019-11-01 NOTE — PROGRESS NOTES
Reason for Admission:   Fever and body aches                   RRAT Score:   3-Low                  Plan for utilizing home health:    No anticipated needs. Current Advanced Directive/Advance Care Plan: Patient does not have an AMD on file and is a Full code. Transition of Care Plan:     CM met with patient to inform of CM role and to assess needs. Patient lives at home with his wife. He is independent with ADLs and self-care; no DME used. Patient's PCP is Dr. Ginna Goncalves whom he last seen in September. Preferred pharmacy is [facility]. Patient remains on antibiotics. CM to monitor for any transitional care needs.      Nataliia Martinez MS

## 2019-11-01 NOTE — H&P
HISTORY AND PHYSICAL      PCP: Ara Choe MD  History source: the patient      CC: fever and body aches      HPI: 63 y.o man with DM, hyperlipidemia, hypothyroidism, who presents with fever and body aches. Symptoms began around 4 PM today. He developed a fever of 101 F. He then felt aches and chills all over his body. He underwent a transrectal prostate biopsy on 10/30/19. He was prescribed ciprofloxacin and cefdinir post-procedure and has been taking them as instructed. He also took Tylenol with some subjective improvement of his fever. He now has dysuria, specifically a urethral burning sensation at the end of urination. He denies nausea, vomiting, cough, diarrhea. He does have a headache, no neck stiffness or photophobia. PMH/PSH:  Past Medical History:   Diagnosis Date    Diabetes ([address]     Elevated PSA     Migraines    Hypothyroidism  Hyperlipidemia    Past Surgical History:   Procedure Laterality Date    HX HERNIA REPAIR      HX SEPTOPLASTY         Home meds:   Prior to Admission medications    Medication Sig Start Date End Date Taking? Authorizing Provider   atorvastatin (LIPITOR) 20 mg tablet Take 20 mg by mouth daily. Yes Provider, Historical   empagliflozin (JARDIANCE) 25 mg tablet Take 25 mg by mouth daily. Yes Provider, Historical   indapamide (LOZOL) 2.5 mg tablet Take 2.5 mg by mouth daily. Yes Provider, Historical   levothyroxine (SYNTHROID) 75 mcg tablet Take 75 mcg by mouth Daily (before breakfast). Yes Provider, Historical   metFORMIN ER (GLUCOPHAGE XR) 500 mg tablet Take 1,000 mg by mouth two (2) times a day. Yes Provider, Historical   tadalafil (CIALIS) 5 mg tablet Take 5-20 mg by mouth daily as needed. Yes Provider, Historical   docosahexanoic acid/epa (FISH OIL PO) Take 2 Caps by mouth two (2) times a day. Yes Provider, Historical   cholecalciferol (VITAMIN D3) (1000 Units /25 mcg) tablet Take 1,000 Units by mouth every evening.    Yes Provider, Historical   zinc 50 mg tab tablet Take 25 mg by mouth two (2) times a day. Yes Provider, Historical   lutein 40 mg cap Take 40 mg by mouth nightly. Yes Provider, Historical   acetaminophen (TYLENOL) 325 mg tablet Take 325 mg by mouth every four (4) hours as needed for Pain. Yes Provider, Historical       Allergies:  No Known Allergies    FH:  History reviewed. No pertinent family history. SH:  Social History     Tobacco Use    Smoking status: Never Smoker    Smokeless tobacco: Never Used   Substance Use Topics    Alcohol use: Yes     Comment: Social       ROS: A comprehensive review of systems was negative except for that written in the HPI. PHYSICAL EXAM:  Visit Vitals  /81   Pulse (!) 122   Temp 100.3 °F (37.9 °C)   Resp 18   Ht 6' 1\" (1.854 m)   Wt 98.4 kg (217 lb)   SpO2 94%   BMI 28.63 kg/m²       Gen: NAD, non-toxic  HEENT: anicteric sclerae, normal conjunctiva, oropharynx clear, MM moist  Neck: supple, trachea midline, no adenopathy  Heart: RR, tachycardic, no MRG, no JVD, no peripheral edema  Lungs: CTA b/l, non-labored respirations  Abd: soft, NT, ND, BS+, no organomegaly  Extr: warm  Skin: dry, no rash  Neuro: CN II-XII grossly intact, normal speech, moves all extremities  Psych: normal mood, appropriate affect      Labs/Imaging:  Recent Results (from the past 24 hour(s))   METABOLIC PANEL, COMPREHENSIVE    Collection Time: 10/31/19  7:39 PM   Result Value Ref Range    Sodium 134 (L) 136 - 145 mmol/L    Potassium 3.5 3.5 - 5.1 mmol/L    Chloride 98 97 - 108 mmol/L    CO2 29 21 - 32 mmol/L    Anion gap 7 5 - 15 mmol/L    Glucose 107 (H) 65 - 100 mg/dL    BUN 21 (H) 6 - 20 MG/DL    Creatinine 1.32 (H) 0.70 - 1.30 MG/DL    BUN/Creatinine ratio 16 12 - 20      GFR est AA >60 >60 ml/min/1.73m2    GFR est non-AA 56 (L) >60 ml/min/1.73m2    Calcium 9.4 8.5 - 10.1 MG/DL    Bilirubin, total 0.8 0.2 - 1.0 MG/DL    ALT (SGPT) 32 12 - 78 U/L    AST (SGOT) 15 15 - 37 U/L    Alk.  phosphatase 87 45 - 117 U/L Protein, total 7.9 6.4 - 8.2 g/dL    Albumin 4.1 3.5 - 5.0 g/dL    Globulin 3.8 2.0 - 4.0 g/dL    A-G Ratio 1.1 1.1 - 2.2     CBC WITH AUTOMATED DIFF    Collection Time: 10/31/19  7:39 PM   Result Value Ref Range    WBC 11.1 4.1 - 11.1 K/uL    RBC 6.42 (H) 4.10 - 5.70 M/uL    HGB 19.5 (H) 12.1 - 17.0 g/dL    HCT 58.0 (H) 36.6 - 50.3 %    MCV 90.3 80.0 - 99.0 FL    MCH 30.4 26.0 - 34.0 PG    MCHC 33.6 30.0 - 36.5 g/dL    RDW 12.7 11.5 - 14.5 %    PLATELET 814 (L) 274 - 400 K/uL    MPV 10.4 8.9 - 12.9 FL    NRBC 0.0 0  WBC    ABSOLUTE NRBC 0.00 0.00 - 0.01 K/uL    NEUTROPHILS 82 (H) 32 - 75 %    LYMPHOCYTES 9 (L) 12 - 49 %    MONOCYTES 7 5 - 13 %    EOSINOPHILS 1 0 - 7 %    BASOPHILS 1 0 - 1 %    IMMATURE GRANULOCYTES 0 0.0 - 0.5 %    ABS. NEUTROPHILS 9.1 (H) 1.8 - 8.0 K/UL    ABS. LYMPHOCYTES 1.0 0.8 - 3.5 K/UL    ABS. MONOCYTES 0.8 0.0 - 1.0 K/UL    ABS. EOSINOPHILS 0.1 0.0 - 0.4 K/UL    ABS. BASOPHILS 0.1 0.0 - 0.1 K/UL    ABS. IMM. GRANS. 0.0 0.00 - 0.04 K/UL    DF AUTOMATED     SAMPLES BEING HELD    Collection Time: 10/31/19  7:39 PM   Result Value Ref Range    SAMPLES BEING HELD 1RED,1BLU     COMMENT        Add-on orders for these samples will be processed based on acceptable specimen integrity and analyte stability, which may vary by analyte.    URINALYSIS W/ REFLEX CULTURE    Collection Time: 10/31/19  7:39 PM   Result Value Ref Range    Color YELLOW/STRAW      Appearance CLEAR CLEAR      Specific gravity 1.015 1.003 - 1.030      pH (UA) 5.5 5.0 - 8.0      Protein NEGATIVE  NEG mg/dL    Glucose >1,000 (A) NEG mg/dL    Ketone TRACE (A) NEG mg/dL    Bilirubin NEGATIVE  NEG      Blood SMALL (A) NEG      Urobilinogen 0.2 0.2 - 1.0 EU/dL    Nitrites NEGATIVE  NEG      Leukocyte Esterase NEGATIVE  NEG      WBC 20-50 0 - 4 /hpf    RBC 10-20 0 - 5 /hpf    Epithelial cells FEW FEW /lpf    Bacteria NEGATIVE  NEG /hpf    UA:UC IF INDICATED URINE CULTURE ORDERED (A) CNI     POC LACTIC ACID    Collection Time: 10/31/19  7:49 PM   Result Value Ref Range    Lactic Acid (POC) 1.24 0.40 - 2.00 mmol/L       Recent Labs     10/31/19  1939   WBC 11.1   HGB 19.5*   HCT 58.0*   *     Recent Labs     10/31/19  1939   *   K 3.5   CL 98   CO2 29   BUN 21*   CREA 1.32*   *   CA 9.4     Recent Labs     10/31/19  1939   SGOT 15   ALT 32   AP 87   TBILI 0.8   TP 7.9   ALB 4.1   GLOB 3.8       No results for input(s): CPK, CKNDX, TROIQ in the last 72 hours. No lab exists for component: CPKMB    No results for input(s): INR, PTP, APTT, INREXT in the last 72 hours. No results for input(s): PH, PCO2, PO2 in the last 72 hours. No results found.         Assessment & Plan:     Sepsis syndrome: (POA) likely due to UTI +/- acute prostatitis given the recent transrectal biopsy  -IV fluids  -IV antibiotics with levofloxacin and vancomycin given recent instrumentation  -f/u blood and urine cultures  -urology consulted in the ED  -consider imaging to r/o abscess if symptoms persist    Type 2 DM:  -hold PO meds  -SSI/POC checks    Hyperlipidemia:  -continue statin    Hypothyroidism:  -continue synthroid    DVT ppx: SCDs - he may need procedures  Code status: full  Disposition: home when ready    Signed By: Vivian Arriaga MD     October 31, 2019

## 2019-11-01 NOTE — PROGRESS NOTES
Hospitalist Progress Note  Maurizio Catalan MD  Answering service: [phone] OR [phone] from in house phone        Date of Service:  2019  NAME:  Valery Gibson  :  1960  MRN:  [id number]      Admission Summary:   63 y.o man with DM, hyperlipidemia, hypothyroidism, who presents with fever and body aches. Symptoms began around 4 PM today. He developed a fever of 101 F. He then felt aches and chills all over his body. He underwent a transrectal prostate biopsy on 10/30/19. He was prescribed ciprofloxacin and cefdinir post-procedure and has been taking them as instructed. He also took Tylenol with some subjective improvement of his fever. He now has dysuria, specifically a urethral burning sensation at the end of urination. He denies nausea, vomiting, cough, diarrhea. He does have a headache, no neck stiffness or photophobia    Interval history / Subjective:     No new complaints except pain in back of testicular area. Assessment & Plan:     Sepsis syndrome: (POA) likely due to acute prostatitis given the recent transrectal biopsy with fever and Leukocytosis  -IV fluids  -He was started on Levaquin and vancomycin in ER. Adjust per culture report. Usually GN infections so will change to Meropenam. Urologist says he is concerned about ESBL. If no ESBL can switch to Zosyn. -f/u blood and urine cultures  -urology consulted      Type 2 DM:  -hold PO meds  -SSI/POC checks     Hyperlipidemia:  -continue statin     Hypothyroidism:  -continue synthroid    Hypokalemia:  Replace K. Hypomagnesemia:  Replace Mg.   Code status: Full  DVT prophylaxis: SCD    Care Plan discussed with: Patient/Family  Disposition: Home w/Family and TBD     Hospital Problems  Never Reviewed          Codes Class Noted POA    Sepsis ([facility] [address] ICD-10-CM: A41.9  ICD-9-CM: 038.9, 995.91  10/31/2019 Unknown                Review of Systems:   A comprehensive review of systems was negative except for that written in the HPI. Vital Signs:    Last 24hrs VS reviewed since prior progress note. Most recent are:  Visit Vitals  BP (P) 155/83   Pulse (P) 86   Temp (!) 100.5 °F (38.1 °C)   Resp 22   Ht 6' 1\" (1.854 m)   Wt 100.7 kg (222 lb)   SpO2 94%   BMI 29.29 kg/m²         Intake/Output Summary (Last 24 hours) at 11/1/2019 0916  Last data filed at 11/1/2019 4717  Gross per 24 hour   Intake 1732.5 ml   Output 1170 ml   Net 562.5 ml        Physical Examination:             Constitutional:  No acute distress, cooperative, pleasant    ENT:  Oral mucous moist, oropharynx benign. Resp:  CTA bilaterally. No wheezing/rhonchi/rales. No accessory muscle use   CV:  Regular rhythm, normal rate, no murmurs, gallops, rubs    GI:  Soft, non distended, non tender. normoactive bowel sounds, no hepatosplenomegaly     Musculoskeletal:  No edema, warm, 2+ pulses throughout    Neurologic:  Moves all extremities. AAOx3, CN II-XII reviewed     Skin:  Good turgor, no rashes or ulcers       Data Review:    Review and/or order of clinical lab test      Labs:     Recent Labs     11/01/19  0018 10/31/19  1939   WBC 12.2* 11.1   HGB 18.8* 19.5*   HCT 55.2* 58.0*   * 136*     Recent Labs     11/01/19  0018 10/31/19  1939   * 134*   K 3.2* 3.5    98   CO2 23 29   BUN 16 21*   CREA 1.14 1.32*   * 107*   CA 8.6 9.4   MG 1.5*  --    PHOS 2.5*  --      Recent Labs     10/31/19  1939   SGOT 15   ALT 32   AP 87   TBILI 0.8   TP 7.9   ALB 4.1   GLOB 3.8     No results for input(s): INR, PTP, APTT, INREXT in the last 72 hours. No results for input(s): FE, TIBC, PSAT, FERR in the last 72 hours. No results found for: FOL, RBCF   No results for input(s): PH, PCO2, PO2 in the last 72 hours. No results for input(s): CPK, CKNDX, TROIQ in the last 72 hours.     No lab exists for component: CPKMB  No results found for: CHOL, CHOLX, CHLST, CHOLV, HDL, HDLP, LDL, LDLC, DLDLP, TGLX, TRIGL, TRIGP, CHHD, [facility]  Lab Results   Component Value Date/Time    Glucose (POC) 125 (H) 11/01/2019 06:09 AM     Lab Results   Component Value Date/Time    Color YELLOW/STRAW 10/31/2019 07:39 PM    Appearance CLEAR 10/31/2019 07:39 PM    Specific gravity 1.015 10/31/2019 07:39 PM    pH (UA) 5.5 10/31/2019 07:39 PM    Protein NEGATIVE  10/31/2019 07:39 PM    Glucose >1,000 (A) 10/31/2019 07:39 PM    Ketone TRACE (A) 10/31/2019 07:39 PM    Bilirubin NEGATIVE  10/31/2019 07:39 PM    Urobilinogen 0.2 10/31/2019 07:39 PM    Nitrites NEGATIVE  10/31/2019 07:39 PM    Leukocyte Esterase NEGATIVE  10/31/2019 07:39 PM    Epithelial cells FEW 10/31/2019 07:39 PM    Bacteria NEGATIVE  10/31/2019 07:39 PM    WBC 20-50 10/31/2019 07:39 PM    RBC 10-20 10/31/2019 07:39 PM         Medications Reviewed:     Current Facility-Administered Medications   Medication Dose Route Frequency    sodium chloride (NS) flush 5-40 mL  5-40 mL IntraVENous Q8H    sodium chloride (NS) flush 5-40 mL  5-40 mL IntraVENous PRN    acetaminophen (TYLENOL) tablet 650 mg  650 mg Oral Q4H PRN    ondansetron (ZOFRAN) injection 4 mg  4 mg IntraVENous Q4H PRN    0.9% sodium chloride infusion  150 mL/hr IntraVENous CONTINUOUS    levoFLOXacin (LEVAQUIN) 500 mg in D5W IVPB  500 mg IntraVENous Q24H    atorvastatin (LIPITOR) tablet 20 mg  20 mg Oral DAILY    fish oil-omega-3 fatty acids 340-1,000 mg capsule 2 Cap  2 Cap Oral BID    levothyroxine (SYNTHROID) tablet 75 mcg  75 mcg Oral ACB    insulin lispro (HUMALOG) injection   SubCUTAneous AC&HS    glucose chewable tablet 16 g  4 Tab Oral PRN    glucagon (GLUCAGEN) injection 1 mg  1 mg IntraMUSCular PRN    dextrose 10% infusion 0-250 mL  0-250 mL IntraVENous PRN    Vancomycin - pharmacy to dose   Other Rx Dosing/Monitoring    vancomycin (VANCOCIN) 1500 mg in  ml infusion  1,500 mg IntraVENous Q16H     ______________________________________________________________________  EXPECTED LENGTH OF STAY: - - -  ACTUAL LENGTH OF STAY:          1175 Derrick Goodman MD

## 2019-11-01 NOTE — PROGRESS NOTES
Spiritual Care Assessment/Progress Note  [facility]      NAME: Nirmala Mckeon      MRN: [id number]  AGE: 63 y.o.  SEX: male  Yazidi Affiliation: Non Shinto   Language: English     11/1/2019     Total Time (in minutes): 13     Spiritual Assessment begun in [facility] IMCU through conversation with:         []Patient        [x] Family    [] Friend(s)        Reason for Consult: Advance medical directive consult     Spiritual beliefs: (Please include comment if needed)     [x] Identifies with a robbin tradition:         [] Supported by a robbin community:            [] Claims no spiritual orientation:           [] Seeking spiritual identity:                [] Adheres to an individual form of spirituality:           [] Not able to assess:                           Identified resources for coping:      [] Prayer                               [] Music                  [] Guided Imagery     [x] Family/friends                 [] Pet visits     [] Devotional reading                         [] Unknown     [] Other:                                               Interventions offered during this visit: (See comments for more details)          Family/Friend(s): Advance medical directive consult, Affirmation of emotions/emotional suffering, Normalization of emotional/spiritual concerns, Prayer (assurance of)     Plan of Care:     [x] Support spiritual and/or cultural needs    [x] Support AMD and/or advance care planning process      [] Support grieving process   [] Coordinate Rites and/or Rituals    [] Coordination with community clergy   [] No spiritual needs identified at this time   [] Detailed Plan of Care below (See Comments)  [] Make referral to Music Therapy  [] Make referral to Pet Therapy     [] Make referral to Addiction services  [] Make referral to [facility]  [] Make referral to Spiritual Care Partner  [] No future visits requested        [x] Follow up visits as needed     Comments:  visit for Advance Medical Directive (AMD) consult. Pt was sleeping and pt's wife, Caprice Link was at bedside.  left paperwork with Caprice Link and let her know to have [phone] [facility] contacted if they have any questions or would like to complete AMD. Let them know of  support and availability.  follow up as needed.      Mckenna Balbuena, MACE   [phone]

## 2019-11-01 NOTE — ROUTINE PROCESS
TRANSFER - OUT REPORT: 
 
Verbal report given to Levi Mccullough RN(name) on Kandis Walker  being transferred to [facility]) for routine progression of care Report consisted of patients Situation, Background, Assessment and  
Recommendations(SBAR). Information from the following report(s) SBAR, ED Summary, MAR, Recent Results and Cardiac Rhythm NSR was reviewed with the receiving nurse. Lines:  
Peripheral IV 10/31/19 Left Antecubital (Active) Site Assessment Clean, dry, & intact 10/31/2019  7:51 PM  
Phlebitis Assessment 0 10/31/2019  7:51 PM  
Infiltration Assessment 0 10/31/2019  7:51 PM  
Dressing Status Clean, dry, & intact 10/31/2019  7:51 PM  
Dressing Type 4 X 4;Transparent 10/31/2019  7:51 PM  
Hub Color/Line Status Pink;Patent 10/31/2019  7:51 PM  
   
Peripheral IV 10/31/19 Right (Active) Site Assessment Clean, dry, & intact 10/31/2019 10:15 PM  
Phlebitis Assessment 0 10/31/2019 10:15 PM  
Infiltration Assessment 0 10/31/2019 10:15 PM  
Dressing Status Clean, dry, & intact 10/31/2019 10:15 PM  
  
 
Opportunity for questions and clarification was provided. Patient transported with: 
 Monitor Visit Vitals /78 Pulse (!) 126 Temp 100 °F (37.8 °C) Resp 19 Ht 6' 1\" (1.854 m) Wt 98.4 kg (217 lb) SpO2 93% BMI 28.63 kg/m²

## 2019-11-01 NOTE — ACP (ADVANCE CARE PLANNING)
visit for Advance Medical Directive (AMD) consult. Pt was sleeping and pt's wife, Jodee Woods was at bedside.  left paperwork with Jodee Woods and let her know to have [phone] [facility] contacted if they have any questions or would like to complete AMD. Let them know of  support and availability.  follow up as needed.      Mckenna Balbuena, MACE   [phone]

## 2019-11-01 NOTE — PROGRESS NOTES
Problem: Sepsis: Day 2  Goal: *Tolerating diet  Outcome: Progressing Towards Goal  Pt tolerating prescribed diet  Goal: Activity/Safety  Outcome: Progressing Towards Goal  Pt OOB to bathroom with [email or web address]  Goal: Diagnostic Test/Procedures  Outcome: Progressing Towards Goal  Daily BMP and CBC ordered for pt  Goal: Medications  Outcome: Progressing Towards Goal  Pt on van, levaquin, rocephin. Cont.  IVF infusing  Goal: Respiratory  Outcome: Progressing Towards Goal  Pt on RA saturating above 95%

## 2019-11-01 NOTE — PROGRESS NOTES
Pharmacist Note - Vancomycin Dosing    Consult provided for this 63 y.o. male for indication of prostatitis/UTI. Antibiotic regimen(s): Vanc + Ceftriaxone + Levaquin    Recent Labs     10/31/19  1939   WBC 11.1   CREA 1.32*   BUN 21*     Frequency of BMP: daily x 3  Height: 185.4 cm  Weight: 98.4 kg  Est CrCl: 75 ml/min; UO: -- ml/kg/hr  Temp (24hrs), Av.9 °F (38.3 °C), Min:100.3 °F (37.9 °C), Max:101.5 °F (38.6 °C)    Cultures:  10/31 blood - pending  10/31 urine - pendiing    Goal trough = 10 - 15 mcg/mL    Therapy will be initiated with a loading dose of 2000 mg IV x 1 to be followed by a maintenance dose of 1500 mg IV every 16 hours. Pharmacy to follow patient daily and order levels / make dose adjustments as appropriate.

## 2019-11-01 NOTE — PROGRESS NOTES
Please see full consult note. Mr. Naresh Mcpherson admitted last night for sepsis s/p TRUSP biopsy. Covered pre-procedure and post-procedure with po ciprofloxacin and cefdinir (day before, day of, and day after biopsy). Temperature now up to 102 F. Gram neg rods in 1/4 blood culture bottles. ESBL organism is common culprit in these situations. Will ask for meropenem or ertapenem which I understand necessitates an ID consult. I much appreciate Dr. Randy Turner (hospitalist) and Dr. Josue Aguirre assistance taking care of this nice gentleman.

## 2019-11-01 NOTE — PROGRESS NOTES
Last 3 Recorded Weights in this Encounter    10/31/19 1901 10/31/19 2243 11/01/19 0342   Weight: 98.4 kg (217 lb) 100.8 kg (222 lb 4.8 oz) 100.7 kg (222 lb)   First weight was off the unit    Problem: Sepsis: Day of Diagnosis  Goal: *Fluid resuscitation  Outcome: Progressing Towards Goal  Goal: *Paired blood cultures prior to first dose of antibiotic  Outcome: Progressing Towards Goal  Goal: *Lactic acid with first set of blood cultures  Outcome: Progressing Towards Goal  Goal: Activity/Safety  Outcome: Progressing Towards Goal  Goal: Nutrition/Diet  Outcome: Progressing Towards Goal  Goal: Medications  Outcome: Progressing Towards Goal  Note:   Given Vancomycin and Rocephin  Goal: Respiratory  Outcome: Progressing Towards Goal  Goal: Psychosocial  Outcome: Progressing Towards Goal

## 2019-11-02 LAB
ANION GAP SERPL CALC-SCNC: 10 MMOL/L (ref 5–15)
BACTERIA SPEC CULT: ABNORMAL
BUN SERPL-MCNC: 15 MG/DL (ref 6–20)
BUN/CREAT SERPL: 14 (ref 12–20)
CALCIUM SERPL-MCNC: 8.6 MG/DL (ref 8.5–10.1)
CC UR VC: ABNORMAL
CHLORIDE SERPL-SCNC: 104 MMOL/L (ref 97–108)
CO2 SERPL-SCNC: 19 MMOL/L (ref 21–32)
CREAT SERPL-MCNC: 1.09 MG/DL (ref 0.7–1.3)
ERYTHROCYTE [DISTWIDTH] IN BLOOD BY AUTOMATED COUNT: 12.4 % (ref 11.5–14.5)
GLUCOSE BLD STRIP.AUTO-MCNC: 111 MG/DL (ref 65–100)
GLUCOSE BLD STRIP.AUTO-MCNC: 113 MG/DL (ref 65–100)
GLUCOSE BLD STRIP.AUTO-MCNC: 96 MG/DL (ref 65–100)
GLUCOSE SERPL-MCNC: 100 MG/DL (ref 65–100)
HCT VFR BLD AUTO: 55.6 % (ref 36.6–50.3)
HGB BLD-MCNC: 19.1 G/DL (ref 12.1–17)
MCH RBC QN AUTO: 30.8 PG (ref 26–34)
MCHC RBC AUTO-ENTMCNC: 34.4 G/DL (ref 30–36.5)
MCV RBC AUTO: 89.5 FL (ref 80–99)
NRBC # BLD: 0 K/UL (ref 0–0.01)
NRBC BLD-RTO: 0 PER 100 WBC
PLATELET # BLD AUTO: 102 K/UL (ref 150–400)
PMV BLD AUTO: 10.2 FL (ref 8.9–12.9)
POTASSIUM SERPL-SCNC: 3.5 MMOL/L (ref 3.5–5.1)
RBC # BLD AUTO: 6.21 M/UL (ref 4.1–5.7)
SERVICE CMNT-IMP: ABNORMAL
SERVICE CMNT-IMP: NORMAL
SODIUM SERPL-SCNC: 133 MMOL/L (ref 136–145)
WBC # BLD AUTO: 9.5 K/UL (ref 4.1–11.1)

## 2019-11-02 PROCEDURE — 74011250636 HC RX REV CODE- 250/636: Performed by: HOSPITALIST

## 2019-11-02 PROCEDURE — 36415 COLL VENOUS BLD VENIPUNCTURE: CPT

## 2019-11-02 PROCEDURE — 85027 COMPLETE CBC AUTOMATED: CPT

## 2019-11-02 PROCEDURE — 74011000258 HC RX REV CODE- 258: Performed by: HOSPITALIST

## 2019-11-02 PROCEDURE — 87040 BLOOD CULTURE FOR BACTERIA: CPT

## 2019-11-02 PROCEDURE — 80048 BASIC METABOLIC PNL TOTAL CA: CPT

## 2019-11-02 PROCEDURE — 82962 GLUCOSE BLOOD TEST: CPT

## 2019-11-02 PROCEDURE — 74011250637 HC RX REV CODE- 250/637: Performed by: HOSPITALIST

## 2019-11-02 PROCEDURE — 74011250637 HC RX REV CODE- 250/637: Performed by: UROLOGY

## 2019-11-02 PROCEDURE — 65660000000 HC RM CCU STEPDOWN

## 2019-11-02 RX ORDER — SODIUM CHLORIDE 0.9 % (FLUSH) 0.9 %
5-40 SYRINGE (ML) INJECTION EVERY 8 HOURS
Status: DISCONTINUED | OUTPATIENT
Start: 2019-11-02 | End: 2019-11-04 | Stop reason: HOSPADM

## 2019-11-02 RX ORDER — SODIUM CHLORIDE 0.9 % (FLUSH) 0.9 %
5-40 SYRINGE (ML) INJECTION AS NEEDED
Status: DISCONTINUED | OUTPATIENT
Start: 2019-11-02 | End: 2019-11-04 | Stop reason: HOSPADM

## 2019-11-02 RX ADMIN — ACETAMINOPHEN 650 MG: 325 TABLET, FILM COATED ORAL at 09:28

## 2019-11-02 RX ADMIN — ATORVASTATIN CALCIUM 20 MG: 20 TABLET, FILM COATED ORAL at 09:01

## 2019-11-02 RX ADMIN — SODIUM CHLORIDE 150 ML/HR: 900 INJECTION, SOLUTION INTRAVENOUS at 05:25

## 2019-11-02 RX ADMIN — LEVOTHYROXINE SODIUM 75 MCG: 75 TABLET ORAL at 07:31

## 2019-11-02 RX ADMIN — MEROPENEM 500 MG: 500 INJECTION, POWDER, FOR SOLUTION INTRAVENOUS at 17:21

## 2019-11-02 RX ADMIN — Medication 10 ML: at 13:09

## 2019-11-02 RX ADMIN — ACETAMINOPHEN 650 MG: 325 TABLET, FILM COATED ORAL at 18:57

## 2019-11-02 RX ADMIN — MAGNESIUM OXIDE TAB 400 MG (241.3 MG ELEMENTAL MG) 400 MG: 400 (241.3 MG) TAB at 09:01

## 2019-11-02 RX ADMIN — Medication 10 ML: at 05:24

## 2019-11-02 RX ADMIN — MEROPENEM 500 MG: 500 INJECTION, POWDER, FOR SOLUTION INTRAVENOUS at 21:50

## 2019-11-02 RX ADMIN — MEROPENEM 500 MG: 500 INJECTION, POWDER, FOR SOLUTION INTRAVENOUS at 09:01

## 2019-11-02 RX ADMIN — Medication 2 CAPSULE: at 22:39

## 2019-11-02 RX ADMIN — Medication 10 ML: at 21:50

## 2019-11-02 RX ADMIN — Medication 10 ML: at 21:51

## 2019-11-02 RX ADMIN — MEROPENEM 500 MG: 500 INJECTION, POWDER, FOR SOLUTION INTRAVENOUS at 03:46

## 2019-11-02 NOTE — PROGRESS NOTES
Bedside shift change report given to Mireille Sheets RN (oncoming nurse) by Zander Douglas RN (offgoing nurse). Report included the following information SBAR, Kardex, MAR and Cardiac Rhythm sinus tach.

## 2019-11-02 NOTE — PROGRESS NOTES
Progress Note    Patient: Michelle Luciano MRN: [id number]  SSN: [id number]    YOB: 1960  Age: 63 y.o. Sex: male        ADMITTED:  10/31/2019 to Liat Ewing MD  for Sepsis [facility]) [A41.9]         Michelle Luciano was admitted for Sepsis [facility]) [A41.9]. He is feeling much better. No fever since last night. No chills now. Vitals:  Temp (24hrs), Av.5 °F (37.5 °C), Min:97.9 °F (36.6 °C), Max:102.4 °F (39.1 °C)     Blood pressure (!) 153/99, pulse 95, temperature 98.1 °F (36.7 °C), resp. rate 17, height 6' 1\" (1.854 m), weight 100.7 kg (222 lb), SpO2 97 %. I&O's:  10/31 1901 -  07  In: 1732.5 [I.V.:1732.5]  Out: 3876 [Urine:3510]    07 - 1900  In: -   Out: 240 [Urine:240]     Exam:   NAD. abdomen soft, NT     Labs:   Recent Labs     19  0605 19  0018 10/31/19  1939   WBC 9.5 12.2* 11.1   HGB 19.1* 18.8* 19.5*   HCT 55.6* 55.2* 58.0*   * 124* 136*     Recent Labs     19  0551 19  0018 10/31/19  1939   * 134* 134*   K 3.5 3.2* 3.5    103 98   CO2 19* 23 29    127* 107*   BUN 15 16 21*   CREA 1.09 1.14 1.32*   CA 8.6 8.6 9.4        Cultures:   ESBL in blood and urine   Imaging:       Assessment:     - Active Problems:    Sepsis ([address] (10/31/2019)        Plan:     - On meropenem for ESBL bacteremia. Continue to monitor in the hospital until he is afebrile for at least 24 hours.   Per ID he may need to go home on IV abx.    - HLIV    Signed By: Avelino Overton MD - 2019

## 2019-11-02 NOTE — PROGRESS NOTES
Problem: Risk for Spread of Infection  Goal: Prevent transmission of infectious organism to others  Outcome: Progressing Towards Goal  Patient placed on contact isolation. Educated family, visitors and patients about spread of infection.

## 2019-11-02 NOTE — PROGRESS NOTES
Hospitalist Progress Note  Verito Burroughs MD  Answering service: [phone] OR [phone] from in house phone        Date of Service:  2019  NAME:  Brien Mac  :  1960  MRN:  [id number]      Admission Summary:   63 y.o man with DM, hyperlipidemia, hypothyroidism, who presents with fever and body aches. Symptoms began around 4 PM today. He developed a fever of 101 F. He then felt aches and chills all over his body. He underwent a transrectal prostate biopsy on 10/30/19. He was prescribed ciprofloxacin and cefdinir post-procedure and has been taking them as instructed. He also took Tylenol with some subjective improvement of his fever. He now has dysuria, specifically a urethral burning sensation at the end of urination. He denies nausea, vomiting, cough, diarrhea. He does have a headache, no neck stiffness or photophobia    Interval history / Subjective:   Pt walking in the room no new complains       Assessment & Plan:     Sepsis syndrome: (POA) likely due to acute prostatitis given the recent transrectal biopsy with fever and Leukocytosis  -IV fluids bp stable   -on Merrem   -f/u blood and urine cultures  -urology consulted      Type 2 DM:  -hold PO meds  -SSI/POC checks     Hyperlipidemia:  -continue statin     Hypothyroidism:  -continue synthroid    Hypokalemia:  Replace K. Hypomagnesemia:  Replace Mg. Code status: Full  DVT prophylaxis: SCD    Care Plan discussed with: Patient/Family  Disposition: Home w/Family and TBD   Can tx to tele     Hospital Problems  Never Reviewed          Codes Class Noted POA    Sepsis ([facility] [address] ICD-10-CM: A41.9  ICD-9-CM: 038.9, 995.91  10/31/2019 Unknown                Review of Systems:   A comprehensive review of systems was negative except for that written in the HPI. Vital Signs:    Last 24hrs VS reviewed since prior progress note.  Most recent are:  Visit Vitals  BP (!) 149/96 (BP 1 Location: Right arm, BP Patient Position: At rest)   Pulse (!) 106   Temp 97.9 °F (36.6 °C)   Resp 17   Ht 6' 1\" (1.854 m)   Wt 100.7 kg (222 lb)   SpO2 96%   BMI 29.29 kg/m²         Intake/Output Summary (Last 24 hours) at 11/2/2019 1036  Last data filed at 11/2/2019 0743  Gross per 24 hour   Intake    Output 2580 ml   Net -2580 ml        Physical Examination:             Constitutional:  No acute distress, cooperative, pleasant    ENT:  Oral mucous moist, oropharynx benign. Resp:  CTA bilaterally. No wheezing/rhonchi/rales. No accessory muscle use   CV:  Regular rhythm, normal rate, no murmurs, gallops, rubs    GI:  Soft, non distended, non tender. normoactive bowel sounds, no hepatosplenomegaly     Musculoskeletal:  No edema, warm, 2+ pulses throughout    Neurologic:  Moves all extremities. AAOx3, CN II-XII reviewed     Skin:  Good turgor, no rashes or ulcers       Data Review:    Review and/or order of clinical lab test      Labs:     Recent Labs     11/02/19  0605 11/01/19 0018   WBC 9.5 12.2*   HGB 19.1* 18.8*   HCT 55.6* 55.2*   * 124*     Recent Labs     11/02/19  0551 11/01/19  0018 10/31/19  1939   * 134* 134*   K 3.5 3.2* 3.5    103 98   CO2 19* 23 29   BUN 15 16 21*   CREA 1.09 1.14 1.32*    127* 107*   CA 8.6 8.6 9.4   MG  --  1.5*  --    PHOS  --  2.5*  --      Recent Labs     10/31/19  1939   SGOT 15   ALT 32   AP 87   TBILI 0.8   TP 7.9   ALB 4.1   GLOB 3.8     No results for input(s): INR, PTP, APTT, INREXT, INREXT in the last 72 hours. No results for input(s): FE, TIBC, PSAT, FERR in the last 72 hours. No results found for: FOL, RBCF   No results for input(s): PH, PCO2, PO2 in the last 72 hours. No results for input(s): CPK, CKNDX, TROIQ in the last 72 hours.     No lab exists for component: CPKMB  No results found for: CHOL, CHOLX, CHLST, CHOLV, HDL, HDLP, LDL, LDLC, DLDLP, TGLX, TRIGL, TRIGP, CHHD, CHHDX  Lab Results   Component Value Date/Time    Glucose (POC) 96 11/02/2019 07:41 AM    Glucose (POC) 110 (H) 11/01/2019 09:21 PM    Glucose (POC) 106 (H) 11/01/2019 05:00 PM    Glucose (POC) 107 (H) 11/01/2019 12:29 PM    Glucose (POC) 125 (H) 11/01/2019 06:09 AM     Lab Results   Component Value Date/Time    Color YELLOW/STRAW 10/31/2019 07:39 PM    Appearance CLEAR 10/31/2019 07:39 PM    Specific gravity 1.015 10/31/2019 07:39 PM    pH (UA) 5.5 10/31/2019 07:39 PM    Protein NEGATIVE  10/31/2019 07:39 PM    Glucose >1,000 (A) 10/31/2019 07:39 PM    Ketone TRACE (A) 10/31/2019 07:39 PM    Bilirubin NEGATIVE  10/31/2019 07:39 PM    Urobilinogen 0.2 10/31/2019 07:39 PM    Nitrites NEGATIVE  10/31/2019 07:39 PM    Leukocyte Esterase NEGATIVE  10/31/2019 07:39 PM    Epithelial cells FEW 10/31/2019 07:39 PM    Bacteria NEGATIVE  10/31/2019 07:39 PM    WBC 20-50 10/31/2019 07:39 PM    RBC 10-20 10/31/2019 07:39 PM         Medications Reviewed:     Current Facility-Administered Medications   Medication Dose Route Frequency    magnesium oxide (MAG-OX) tablet 400 mg  400 mg Oral DAILY    acetaminophen (TYLENOL) tablet 650 mg  650 mg Oral Q4H PRN    ibuprofen (MOTRIN) tablet 600 mg  600 mg Oral Q8H PRN    meropenem (MERREM) 500 mg in 0.9% sodium chloride (MBP/ADV) 50 mL  500 mg IntraVENous Q6H    sodium chloride (NS) flush 5-40 mL  5-40 mL IntraVENous Q8H    sodium chloride (NS) flush 5-40 mL  5-40 mL IntraVENous PRN    ondansetron (ZOFRAN) injection 4 mg  4 mg IntraVENous Q4H PRN    0.9% sodium chloride infusion  150 mL/hr IntraVENous CONTINUOUS    atorvastatin (LIPITOR) tablet 20 mg  20 mg Oral DAILY    fish oil-omega-3 fatty acids 340-1,000 mg capsule 2 Cap  2 Cap Oral BID    levothyroxine (SYNTHROID) tablet 75 mcg  75 mcg Oral ACB    insulin lispro (HUMALOG) injection   SubCUTAneous AC&HS    glucose chewable tablet 16 g  4 Tab Oral PRN    glucagon (GLUCAGEN) injection 1 mg  1 mg IntraMUSCular PRN    dextrose 10% infusion 0-250 mL  0-250 mL IntraVENous PRN ______________________________________________________________________  EXPECTED LENGTH OF STAY: - - -  ACTUAL LENGTH OF STAY:          2                 Elmer Crisostomo MD

## 2019-11-02 NOTE — CONSULTS
ID Consult Note     NAME:  Christina Dale   :   1960   MRN:   [id number]   Date/Time:  2019 9:39 PM  Subjective:   REASON FOR CONSULT:    Gram negative bacteremia     Eugenia Hernandez is a 63 y.o. with a history of diabetes and elevated PSA. He was in his usual state of health until recently. Because of the elevated PSA, he had a transrectal prostate biopsy done last . He took ciprofloxacin and cefdinir the day prior, the day of and the day after the procedure. On the afternoon of , he suddenly developed fever and chills. This was associated with a headache. He had dysuria, but he did not have any nausea or vomiting. He did not have any abdominal pain. Because of his symptoms he was advised admission to the hospital. His blood and urine cultures have grown out He was started on merrem as there was a chance that he would have a resitant organism. There were no aggravating or alleviating factors. We are being asked to assist in the case. Past Medical History:   Diagnosis Date    Diabetes ([address]     Elevated PSA     Migraines     Hypothyroidism   Hyperlipidemia     Past Surgical History:   Procedure Laterality Date    HX HERNIA REPAIR      HX SEPTOPLASTY     transrectal prostate biopsy     Social History     Tobacco Use    Smoking status: Never Smoker    Smokeless tobacco: Never Used   Substance Use Topics    Alcohol use: Yes     Comment: Social   He does not engage in drug use. FAMILY HISTORY  Both parents have elevated cholesterol. No Known Allergies   Home Medications:  Prior to Admission Medications   Prescriptions Last Dose Informant Patient Reported? Taking?   acetaminophen (TYLENOL) 325 mg tablet 10/31/2019 at Unknown time  Yes Yes   Sig: Take 325 mg by mouth every four (4) hours as needed for Pain. atorvastatin (LIPITOR) 20 mg tablet 10/31/2019 at AM  Yes Yes   Sig: Take 20 mg by mouth daily.    cholecalciferol (VITAMIN D3) (1000 Units /25 mcg) tablet 10/30/2019 at Unknown time  Yes Yes   Sig: Take 1,000 Units by mouth every evening. docosahexanoic acid/epa (FISH OIL PO) 10/31/2019 at Unknown time  Yes Yes   Sig: Take 2 Caps by mouth two (2) times a day. empagliflozin (JARDIANCE) 25 mg tablet 10/31/2019 at AM  Yes Yes   Sig: Take 25 mg by mouth daily. indapamide (LOZOL) 2.5 mg tablet 10/31/2019 at AM  Yes Yes   Sig: Take 2.5 mg by mouth daily. levothyroxine (SYNTHROID) 75 mcg tablet 10/31/2019 at AM  Yes Yes   Sig: Take 75 mcg by mouth Daily (before breakfast). lutein 40 mg cap 10/30/2019 at Unknown time  Yes Yes   Sig: Take 40 mg by mouth nightly. metFORMIN ER (GLUCOPHAGE XR) 500 mg tablet 10/31/2019 at AM  Yes Yes   Sig: Take 1,000 mg by mouth two (2) times a day. tadalafil (CIALIS) 5 mg tablet   Yes Yes   Sig: Take 5-20 mg by mouth daily as needed. zinc 50 mg tab tablet 10/31/2019 at Unknown time  Yes Yes   Sig: Take 25 mg by mouth two (2) times a day.       Facility-Administered Medications: None     Hospital medications:  Current Facility-Administered Medications   Medication Dose Route Frequency    magnesium oxide (MAG-OX) tablet 400 mg  400 mg Oral DAILY    acetaminophen (TYLENOL) tablet 650 mg  650 mg Oral Q4H PRN    ibuprofen (MOTRIN) tablet 600 mg  600 mg Oral Q8H PRN    meropenem (MERREM) 500 mg in 0.9% sodium chloride (MBP/ADV) 50 mL  500 mg IntraVENous Q6H    sodium chloride (NS) flush 5-40 mL  5-40 mL IntraVENous Q8H    sodium chloride (NS) flush 5-40 mL  5-40 mL IntraVENous PRN    ondansetron (ZOFRAN) injection 4 mg  4 mg IntraVENous Q4H PRN    0.9% sodium chloride infusion  150 mL/hr IntraVENous CONTINUOUS    atorvastatin (LIPITOR) tablet 20 mg  20 mg Oral DAILY    fish oil-omega-3 fatty acids 340-1,000 mg capsule 2 Cap  2 Cap Oral BID    levothyroxine (SYNTHROID) tablet 75 mcg  75 mcg Oral ACB    insulin lispro (HUMALOG) injection   SubCUTAneous AC&HS    glucose chewable tablet 16 g  4 Tab Oral PRN    glucagon (GLUCAGEN) injection 1 mg  1 mg IntraMUSCular PRN    dextrose 10% infusion 0-250 mL  0-250 mL IntraVENous PRN     REVIEW OF SYSTEMS:        Const:  negative weight loss  Eyes:   negative diplopia or visual changes, negative eye pain  ENT:   negative coryza, negative sore throat  Resp:   negative cough, hemoptysis, dyspnea  Cards:  negative for chest pain, palpitations  :  negative for gross hematuria  GI:   Negative for hematemesis   Skin:   negative for rash and pruritus  Heme:   negative for easy bruising and gum/nose bleeding  MS:  negative for myalgias, arthralgias, back pain and muscle weakness  Neurolo:  negative for headaches, dizziness, vertigo, memory problems   Psych:  negative for feelings of anxiety, depression         Objective:   VITALS:    Visit Vitals  /49 (BP 1 Location: Right arm, BP Patient Position: At rest)   Pulse (!) 129   Temp (!) 102.4 °F (39.1 °C) Comment: notfied juan RN   Resp 15   Ht 6' 1\" (1.854 m)   Wt 100.7 kg (222 lb)   SpO2 93%   BMI 29.29 kg/m²     Temp (24hrs), Av.3 °F (37.9 °C), Min:98.3 °F (36.8 °C), Max:102.4 °F (39.1 °C)    PHYSICAL EXAM:   General:    Alert, cooperative, no distress, appears stated age. Head:   Normocephalic, without obvious abnormality, atraumatic. Eyes:   Conjunctivae clear, anicteric sclerae. Nose:  Nares normal. No drainage or sinus tenderness. Throat:    Lips, mucosa, and tongue normal.    Neck:  Supple, symmetrical,  no carotid bruit and no JVD. :    No CVA tenderness. NO gruber catheter. Lungs:   Clear to auscultation bilaterally. No Wheezing or Rhonchi. No rales. Heart:   Regular rate and rhythm,  no murmur, rub or gallop. Abdomen:   Soft, non-tender,not distended. Bowel sounds normal. No masses  Extremities: Extremities normal, atraumatic, no cyanosis. Skin:     No rashes or lesions. Not Jaundiced  Lymph: Cervical normal.  Neurologic: Muscle strength 5/5, neck supple, tongue midline, speech fluent.  No facial asymmetry     LAB DATA REVIEWED:    Recent Results (from the past 48 hour(s))   METABOLIC PANEL, COMPREHENSIVE    Collection Time: 10/31/19  7:39 PM   Result Value Ref Range    Sodium 134 (L) 136 - 145 mmol/L    Potassium 3.5 3.5 - 5.1 mmol/L    Chloride 98 97 - 108 mmol/L    CO2 29 21 - 32 mmol/L    Anion gap 7 5 - 15 mmol/L    Glucose 107 (H) 65 - 100 mg/dL    BUN 21 (H) 6 - 20 MG/DL    Creatinine 1.32 (H) 0.70 - 1.30 MG/DL    BUN/Creatinine ratio 16 12 - 20      GFR est AA >60 >60 ml/min/1.73m2    GFR est non-AA 56 (L) >60 ml/min/1.73m2    Calcium 9.4 8.5 - 10.1 MG/DL    Bilirubin, total 0.8 0.2 - 1.0 MG/DL    ALT (SGPT) 32 12 - 78 U/L    AST (SGOT) 15 15 - 37 U/L    Alk. phosphatase 87 45 - 117 U/L    Protein, total 7.9 6.4 - 8.2 g/dL    Albumin 4.1 3.5 - 5.0 g/dL    Globulin 3.8 2.0 - 4.0 g/dL    A-G Ratio 1.1 1.1 - 2.2     CBC WITH AUTOMATED DIFF    Collection Time: 10/31/19  7:39 PM   Result Value Ref Range    WBC 11.1 4.1 - 11.1 K/uL    RBC 6.42 (H) 4.10 - 5.70 M/uL    HGB 19.5 (H) 12.1 - 17.0 g/dL    HCT 58.0 (H) 36.6 - 50.3 %    MCV 90.3 80.0 - 99.0 FL    MCH 30.4 26.0 - 34.0 PG    MCHC 33.6 30.0 - 36.5 g/dL    RDW 12.7 11.5 - 14.5 %    PLATELET 883 (L) 532 - 400 K/uL    MPV 10.4 8.9 - 12.9 FL    NRBC 0.0 0  WBC    ABSOLUTE NRBC 0.00 0.00 - 0.01 K/uL    NEUTROPHILS 82 (H) 32 - 75 %    LYMPHOCYTES 9 (L) 12 - 49 %    MONOCYTES 7 5 - 13 %    EOSINOPHILS 1 0 - 7 %    BASOPHILS 1 0 - 1 %    IMMATURE GRANULOCYTES 0 0.0 - 0.5 %    ABS. NEUTROPHILS 9.1 (H) 1.8 - 8.0 K/UL    ABS. LYMPHOCYTES 1.0 0.8 - 3.5 K/UL    ABS. MONOCYTES 0.8 0.0 - 1.0 K/UL    ABS. EOSINOPHILS 0.1 0.0 - 0.4 K/UL    ABS. BASOPHILS 0.1 0.0 - 0.1 K/UL    ABS. IMM.  GRANS. 0.0 0.00 - 0.04 K/UL    DF AUTOMATED     SAMPLES BEING HELD    Collection Time: 10/31/19  7:39 PM   Result Value Ref Range    SAMPLES BEING HELD 1RED,1BLU     COMMENT        Add-on orders for these samples will be processed based on acceptable specimen integrity and analyte stability, which may vary by analyte.    URINALYSIS W/ REFLEX CULTURE    Collection Time: 10/31/19  7:39 PM   Result Value Ref Range    Color YELLOW/STRAW      Appearance CLEAR CLEAR      Specific gravity 1.015 1.003 - 1.030      pH (UA) 5.5 5.0 - 8.0      Protein NEGATIVE  NEG mg/dL    Glucose >1,000 (A) NEG mg/dL    Ketone TRACE (A) NEG mg/dL    Bilirubin NEGATIVE  NEG      Blood SMALL (A) NEG      Urobilinogen 0.2 0.2 - 1.0 EU/dL    Nitrites NEGATIVE  NEG      Leukocyte Esterase NEGATIVE  NEG      WBC 20-50 0 - 4 /hpf    RBC 10-20 0 - 5 /hpf    Epithelial cells FEW FEW /lpf    Bacteria NEGATIVE  NEG /hpf    UA:UC IF INDICATED URINE CULTURE ORDERED (A) CNI     CULTURE, URINE    Collection Time: 10/31/19  7:39 PM   Result Value Ref Range    Special Requests: NO SPECIAL REQUESTS  Reflexed from [id number]        Galt Count [id number]  COLONIES/mL        Culture result: GRAM NEGATIVE RODS (A)     CULTURE, BLOOD, PAIRED    Collection Time: 10/31/19  7:43 PM   Result Value Ref Range    Special Requests: NO SPECIAL REQUESTS      Culture result: (A)       GRAM NEGATIVE RODS GROWING IN 1 OF 4 BOTTLES DRAWN (SITE = R AC)    Culture result: REMAINING BOTTLE(S) HAS/HAVE NO GROWTH SO FAR     POC LACTIC ACID    Collection Time: 10/31/19  7:49 PM   Result Value Ref Range    Lactic Acid (POC) 1.24 0.40 - 2.00 mmol/L   CBC W/O DIFF    Collection Time: 11/01/19 12:18 AM   Result Value Ref Range    WBC 12.2 (H) 4.1 - 11.1 K/uL    RBC 6.15 (H) 4.10 - 5.70 M/uL    HGB 18.8 (H) 12.1 - 17.0 g/dL    HCT 55.2 (H) 36.6 - 50.3 %    MCV 89.8 80.0 - 99.0 FL    MCH 30.6 26.0 - 34.0 PG    MCHC 34.1 30.0 - 36.5 g/dL    RDW 12.4 11.5 - 14.5 %    PLATELET 410 (L) 765 - 400 K/uL    MPV 10.6 8.9 - 12.9 FL    NRBC 0.0 0  WBC    ABSOLUTE NRBC 0.00 0.00 - 2.91 K/uL   METABOLIC PANEL, BASIC    Collection Time: 11/01/19 12:18 AM   Result Value Ref Range    Sodium 134 (L) 136 - 145 mmol/L    Potassium 3.2 (L) 3.5 - 5.1 mmol/L    Chloride 103 97 - 108 mmol/L    CO2 23 21 - 32 mmol/L    Anion gap 8 5 - 15 mmol/L    Glucose 127 (H) 65 - 100 mg/dL    BUN 16 6 - 20 MG/DL    Creatinine 1.14 0.70 - 1.30 MG/DL    BUN/Creatinine ratio 14 12 - 20      GFR est AA >60 >60 ml/min/1.73m2    GFR est non-AA >60 >60 ml/min/1.73m2    Calcium 8.6 8.5 - 10.1 MG/DL   MAGNESIUM    Collection Time: 11/01/19 12:18 AM   Result Value Ref Range    Magnesium 1.5 (L) 1.6 - 2.4 mg/dL   PHOSPHORUS    Collection Time: 11/01/19 12:18 AM   Result Value Ref Range    Phosphorus 2.5 (L) 2.6 - 4.7 MG/DL   GLUCOSE, POC    Collection Time: 11/01/19  6:09 AM   Result Value Ref Range    Glucose (POC) 125 (H) 65 - 100 mg/dL    Performed by Neelima Vega, POC    Collection Time: 11/01/19 12:29 PM   Result Value Ref Range    Glucose (POC) 107 (H) 65 - 100 mg/dL    Performed by Perfecto SWANN    GLUCOSE, POC    Collection Time: 11/01/19  5:00 PM   Result Value Ref Range    Glucose (POC) 106 (H) 65 - 100 mg/dL    Performed by Leodan Tiwari 85, POC    Collection Time: 11/01/19  9:21 PM   Result Value Ref Range    Glucose (POC) 110 (H) 65 - 100 mg/dL    Performed by JIMMY Ford    1. Gram negative bacteremia likely related to prostate biopsy     2. GNR UTI     3. Diabetes     4. Hyperlipidemia    5. hypothyrodism        PLAN     1. Agree with merfarheen as he will likely have a resistant organism as he became bacteremic with a GNR even after he took ciprofloxacin and cefdinir. 2. I suspect he will need IV therapy on discharge      Dr. Tommy Herron will check cx over the weekend. PLease call him with questions.             ___________________________________________________  ID: Maricarmen Gamble MD

## 2019-11-03 LAB
ANION GAP SERPL CALC-SCNC: 9 MMOL/L (ref 5–15)
BUN SERPL-MCNC: 16 MG/DL (ref 6–20)
BUN/CREAT SERPL: 18 (ref 12–20)
CALCIUM SERPL-MCNC: 8.8 MG/DL (ref 8.5–10.1)
CHLORIDE SERPL-SCNC: 105 MMOL/L (ref 97–108)
CO2 SERPL-SCNC: 20 MMOL/L (ref 21–32)
CREAT SERPL-MCNC: 0.89 MG/DL (ref 0.7–1.3)
GLUCOSE BLD STRIP.AUTO-MCNC: 102 MG/DL (ref 65–100)
GLUCOSE BLD STRIP.AUTO-MCNC: 146 MG/DL (ref 65–100)
GLUCOSE BLD STRIP.AUTO-MCNC: 154 MG/DL (ref 65–100)
GLUCOSE BLD STRIP.AUTO-MCNC: 194 MG/DL (ref 65–100)
GLUCOSE SERPL-MCNC: 98 MG/DL (ref 65–100)
POTASSIUM SERPL-SCNC: 3.6 MMOL/L (ref 3.5–5.1)
SERVICE CMNT-IMP: ABNORMAL
SODIUM SERPL-SCNC: 134 MMOL/L (ref 136–145)

## 2019-11-03 PROCEDURE — 65270000032 HC RM SEMIPRIVATE

## 2019-11-03 PROCEDURE — 80048 BASIC METABOLIC PNL TOTAL CA: CPT

## 2019-11-03 PROCEDURE — 74011636637 HC RX REV CODE- 636/637: Performed by: HOSPITALIST

## 2019-11-03 PROCEDURE — 36415 COLL VENOUS BLD VENIPUNCTURE: CPT

## 2019-11-03 PROCEDURE — 74011250637 HC RX REV CODE- 250/637: Performed by: HOSPITALIST

## 2019-11-03 PROCEDURE — 74011250636 HC RX REV CODE- 250/636: Performed by: HOSPITALIST

## 2019-11-03 PROCEDURE — 74011250637 HC RX REV CODE- 250/637: Performed by: UROLOGY

## 2019-11-03 PROCEDURE — 82962 GLUCOSE BLOOD TEST: CPT

## 2019-11-03 PROCEDURE — 74011000258 HC RX REV CODE- 258: Performed by: HOSPITALIST

## 2019-11-03 RX ADMIN — Medication 10 ML: at 06:44

## 2019-11-03 RX ADMIN — Medication 2 CAPSULE: at 08:21

## 2019-11-03 RX ADMIN — Medication 10 ML: at 14:00

## 2019-11-03 RX ADMIN — MEROPENEM 500 MG: 500 INJECTION, POWDER, FOR SOLUTION INTRAVENOUS at 22:07

## 2019-11-03 RX ADMIN — MEROPENEM 500 MG: 500 INJECTION, POWDER, FOR SOLUTION INTRAVENOUS at 15:34

## 2019-11-03 RX ADMIN — MEROPENEM 500 MG: 500 INJECTION, POWDER, FOR SOLUTION INTRAVENOUS at 10:54

## 2019-11-03 RX ADMIN — Medication 10 ML: at 22:07

## 2019-11-03 RX ADMIN — INSULIN LISPRO 2 UNITS: 100 INJECTION, SOLUTION INTRAVENOUS; SUBCUTANEOUS at 16:52

## 2019-11-03 RX ADMIN — Medication 2 CAPSULE: at 22:07

## 2019-11-03 RX ADMIN — MEROPENEM 500 MG: 500 INJECTION, POWDER, FOR SOLUTION INTRAVENOUS at 03:50

## 2019-11-03 RX ADMIN — ATORVASTATIN CALCIUM 20 MG: 20 TABLET, FILM COATED ORAL at 08:21

## 2019-11-03 RX ADMIN — IBUPROFEN 600 MG: 600 TABLET ORAL at 23:45

## 2019-11-03 RX ADMIN — LEVOTHYROXINE SODIUM 75 MCG: 75 TABLET ORAL at 06:44

## 2019-11-03 RX ADMIN — IBUPROFEN 600 MG: 600 TABLET ORAL at 03:58

## 2019-11-03 RX ADMIN — IBUPROFEN 600 MG: 600 TABLET ORAL at 15:34

## 2019-11-03 RX ADMIN — MAGNESIUM OXIDE TAB 400 MG (241.3 MG ELEMENTAL MG) 400 MG: 400 (241.3 MG) TAB at 08:21

## 2019-11-03 NOTE — PROGRESS NOTES
Problem: Patient Education: Go to Patient Education Activity  Goal: Patient/Family Education  Outcome: Progressing Towards Goal     Problem: Risk for Spread of Infection  Goal: Prevent transmission of infectious organism to others  Description  Prevent the transmission of infectious organisms to other patients, staff members, and visitors.   Outcome: Progressing Towards Goal     Problem: Patient Education:  Go to Education Activity  Goal: Patient/Family Education  Outcome: Progressing Towards Goal

## 2019-11-03 NOTE — PROGRESS NOTES
Progress Note    Patient: Mindy Herbert MRN: [id number]  SSN: [id number]    YOB: 1960  Age: 63 y.o. Sex: male        ADMITTED:  10/31/2019 to Siri Brown MD  for Sepsis [facility]) [A41.9]         Mindy Her was admitted for Sepsis [facility]) [A41.9]. He is feeling better. Appetite has returned. Walking. No fever/chills. HR back down to normal.    Vitals:  Temp (24hrs), Av.4 °F (36.9 °C), Min:97.6 °F (36.4 °C), Max:99 °F (37.2 °C)     Blood pressure 131/76, pulse 68, temperature 97.6 °F (36.4 °C), resp. rate 16, height 6' 1\" (1.854 m), weight 97.3 kg (214 lb 8 oz), SpO2 95 %. I&O's:  1901 -  07  In: -   Out: 2153 [MMEAL:9004]    07 -  190  In: -   Out: 400 [Urine:400]     Exam:   NAD. Labs:   Recent Labs     19  0605 19  0018 10/31/19  1939   WBC 9.5 12.2* 11.1   HGB 19.1* 18.8* 19.5*   HCT 55.6* 55.2* 58.0*   * 124* 136*     Recent Labs     19  0354 19  0551 19  0018   * 133* 134*   K 3.6 3.5 3.2*    104 103   CO2 20* 19* 23   GLU 98 100 127*   BUN 16 15 16   CREA 0.89 1.09 1.14   CA 8.8 8.6 8.6        Cultures:      Imaging:       Assessment:     - Active Problems:    Sepsis ([address] (10/31/2019)        Plan:     - Plan is for IV abx x 2 weeks. [id number] Rachael Quinones for d/c home from [facility]. F/u with [facility] as scheduled.     Signed By: Mars Calderon MD - November 3, 2019

## 2019-11-03 NOTE — PROGRESS NOTES
Hospitalist Progress Note  Camila Herzog MD  Answering service: [phone] OR [phone] from in house phone        Date of Service:  11/3/2019  NAME:  Jay Viveros YOB: 1960  MRN:  [id number]      Admission Summary:   63 y.o man with DM, hyperlipidemia, hypothyroidism, who presents with fever and body aches. Symptoms began around 4 PM today. He developed a fever of 101 F. He then felt aches and chills all over his body. He underwent a transrectal prostate biopsy on 10/30/19. He was prescribed ciprofloxacin and cefdinir post-procedure and has been taking them as instructed. He also took Tylenol with some subjective improvement of his fever. He now has dysuria, specifically a urethral burning sensation at the end of urination. He denies nausea, vomiting, cough, diarrhea. He does have a headache, no neck stiffness or photophobia    Interval history / Subjective:   Pt walking in the room no new complains       Assessment & Plan:     Sepsis with E coli bacteremia with ESBL c/s to Merrem  likely due to acute prostatitis given the recent transrectal biopsy with fever and Leukocytosis  -IV fluids bp stable   -on Merrem per ID will need 2 weeks of IV abx can change to ertapenem and get in infusion center   -f/u blood and urine cultures reviewed as  above  -urology consulted   - no fever in last 24 hrs      Type 2 DM:  -hold PO meds  -SSI/POC checks     Hyperlipidemia:  -continue statin     Hypothyroidism:  -continue synthroid    Hypokalemia:  Replace K. Hypomagnesemia:  Replace Mg.     Code status: Full  DVT prophylaxis: SCD    Care Plan discussed with: Patient/Family  Disposition: Home w/Family and TBD   Can tx to surgical      Hospital Problems  Never Reviewed          Codes Class Noted POA    Sepsis ([facility] [address] ICD-10-CM: A41.9  ICD-9-CM: 038.9, 995.91  10/31/2019 Unknown                Review of Systems:   A comprehensive review of systems was negative except for that written in the HPI. Vital Signs:    Last 24hrs VS reviewed since prior progress note. Most recent are:  Visit Vitals  /76 (BP 1 Location: Left arm, BP Patient Position: Lying right side)   Pulse 74   Temp 97.8 °F (36.6 °C)   Resp 13   Ht 6' 1\" (1.854 m)   Wt 97.3 kg (214 lb 8 oz)   SpO2 93%   BMI 28.30 kg/m²         Intake/Output Summary (Last 24 hours) at 11/3/2019 0759  Last data filed at 11/3/2019 0730  Gross per 24 hour   Intake    Output 675 ml   Net -675 ml        Physical Examination:             Constitutional:  No acute distress, cooperative, pleasant    ENT:  Oral mucous moist, oropharynx benign. Resp:  CTA bilaterally. No wheezing/rhonchi/rales. No accessory muscle use   CV:  Regular rhythm, normal rate, no murmurs, gallops, rubs    GI:  Soft, non distended, non tender. normoactive bowel sounds, no hepatosplenomegaly     Musculoskeletal:  No edema, warm, 2+ pulses throughout    Neurologic:  Moves all extremities. AAOx3, CN II-XII reviewed     Skin:  Good turgor, no rashes or ulcers       Data Review:    Review and/or order of clinical lab test      Labs:     Recent Labs     11/02/19  0605 11/01/19  0018   WBC 9.5 12.2*   HGB 19.1* 18.8*   HCT 55.6* 55.2*   * 124*     Recent Labs     11/03/19  0354 11/02/19  0551 11/01/19  0018   * 133* 134*   K 3.6 3.5 3.2*    104 103   CO2 20* 19* 23   BUN 16 15 16   CREA 0.89 1.09 1.14   GLU 98 100 127*   CA 8.8 8.6 8.6   MG  --   --  1.5*   PHOS  --   --  2.5*     Recent Labs     10/31/19  1939   SGOT 15   ALT 32   AP 87   TBILI 0.8   TP 7.9   ALB 4.1   GLOB 3.8     No results for input(s): INR, PTP, APTT, INREXT, INREXT in the last 72 hours. No results for input(s): FE, TIBC, PSAT, FERR in the last 72 hours. No results found for: FOL, RBCF   No results for input(s): PH, PCO2, PO2 in the last 72 hours. No results for input(s): CPK, CKNDX, TROIQ in the last 72 hours.     No lab exists for component: CPKMB  No results found for: CHOL, CHOLX, CHLST, CHOLV, HDL, HDLP, LDL, LDLC, DLDLP, TGLX, TRIGL, TRIGP, CHHD, CHHDX  Lab Results   Component Value Date/Time    Glucose (POC) 102 (H) 11/03/2019 06:26 AM    Glucose (POC) 111 (H) 11/02/2019 04:59 PM    Glucose (POC) 113 (H) 11/02/2019 11:43 AM    Glucose (POC) 96 11/02/2019 07:41 AM    Glucose (POC) 110 (H) 11/01/2019 09:21 PM     Lab Results   Component Value Date/Time    Color YELLOW/STRAW 10/31/2019 07:39 PM    Appearance CLEAR 10/31/2019 07:39 PM    Specific gravity 1.015 10/31/2019 07:39 PM    pH (UA) 5.5 10/31/2019 07:39 PM    Protein NEGATIVE  10/31/2019 07:39 PM    Glucose >1,000 (A) 10/31/2019 07:39 PM    Ketone TRACE (A) 10/31/2019 07:39 PM    Bilirubin NEGATIVE  10/31/2019 07:39 PM    Urobilinogen 0.2 10/31/2019 07:39 PM    Nitrites NEGATIVE  10/31/2019 07:39 PM    Leukocyte Esterase NEGATIVE  10/31/2019 07:39 PM    Epithelial cells FEW 10/31/2019 07:39 PM    Bacteria NEGATIVE  10/31/2019 07:39 PM    WBC 20-50 10/31/2019 07:39 PM    RBC 10-20 10/31/2019 07:39 PM         Medications Reviewed:     Current Facility-Administered Medications   Medication Dose Route Frequency    sodium chloride (NS) flush 5-40 mL  5-40 mL IntraVENous Q8H    sodium chloride (NS) flush 5-40 mL  5-40 mL IntraVENous PRN    magnesium oxide (MAG-OX) tablet 400 mg  400 mg Oral DAILY    acetaminophen (TYLENOL) tablet 650 mg  650 mg Oral Q4H PRN    ibuprofen (MOTRIN) tablet 600 mg  600 mg Oral Q8H PRN    meropenem (MERREM) 500 mg in 0.9% sodium chloride (MBP/ADV) 50 mL  500 mg IntraVENous Q6H    sodium chloride (NS) flush 5-40 mL  5-40 mL IntraVENous Q8H    sodium chloride (NS) flush 5-40 mL  5-40 mL IntraVENous PRN    ondansetron (ZOFRAN) injection 4 mg  4 mg IntraVENous Q4H PRN    atorvastatin (LIPITOR) tablet 20 mg  20 mg Oral DAILY    fish oil-omega-3 fatty acids 340-1,000 mg capsule 2 Cap  2 Cap Oral BID    levothyroxine (SYNTHROID) tablet 75 mcg  75 mcg Oral ACB    insulin lispro (HUMALOG) injection   SubCUTAneous AC&HS    glucose chewable tablet 16 g  4 Tab Oral PRN    glucagon (GLUCAGEN) injection 1 mg  1 mg IntraMUSCular PRN    dextrose 10% infusion 0-250 mL  0-250 mL IntraVENous PRN     ______________________________________________________________________  EXPECTED LENGTH OF STAY: - - -  ACTUAL LENGTH OF STAY:          3                 Jaime Treviño MD

## 2019-11-03 NOTE — PROGRESS NOTES
Pt to room 519B via wheelchair in stable condition, spouse [facility]) at bedside, Ax4, NCOP, no s/s of distress noted.

## 2019-11-03 NOTE — PROGRESS NOTES
TRANSFER - OUT REPORT:    Verbal report given to SARIKA Medina(name) on Simeon Yin  being transferred to (unit) for routine progression of care       Report consisted of patients Situation, Background, Assessment and   Recommendations(SBAR). Information from the following report(s) SBAR, Kardex, ED Summary, Intake/Output, MAR and Accordion was reviewed with the receiving nurse. Lines:   Peripheral IV 10/31/19 Left Antecubital (Active)   Site Assessment Clean, dry, & intact 11/3/2019  7:56 AM   Phlebitis Assessment 0 11/3/2019  7:56 AM   Infiltration Assessment 0 11/3/2019  7:56 AM   Dressing Status Clean, dry, & intact 11/3/2019  7:56 AM   Dressing Type Tape;Transparent 11/3/2019  7:56 AM   Hub Color/Line Status Pink; Infusing 11/3/2019  7:56 AM   Action Taken Open ports on tubing capped 11/3/2019  7:56 AM   Alcohol Cap Used Yes 11/3/2019  7:56 AM       Peripheral IV 10/31/19 Right (Active)   Site Assessment Clean, dry, & intact 11/3/2019  7:56 AM   Phlebitis Assessment 0 11/3/2019  7:56 AM   Infiltration Assessment 0 11/3/2019  7:56 AM   Dressing Status Clean, dry, & intact 11/3/2019  7:56 AM   Dressing Type Tape;Transparent 11/3/2019  7:56 AM   Hub Color/Line Status Pink; Infusing 11/3/2019  7:56 AM   Action Taken Open ports on tubing capped 11/3/2019  7:56 AM   Alcohol Cap Used Yes 11/3/2019  7:56 AM        Opportunity for questions and clarification was provided.       Patient transported with:   [employer]

## 2019-11-04 VITALS
HEIGHT: 73 IN | SYSTOLIC BLOOD PRESSURE: 129 MMHG | WEIGHT: 214.5 LBS | OXYGEN SATURATION: 94 % | TEMPERATURE: 98.3 F | BODY MASS INDEX: 28.43 KG/M2 | HEART RATE: 85 BPM | DIASTOLIC BLOOD PRESSURE: 78 MMHG | RESPIRATION RATE: 18 BRPM

## 2019-11-04 LAB
ANION GAP SERPL CALC-SCNC: 6 MMOL/L (ref 5–15)
BUN SERPL-MCNC: 20 MG/DL (ref 6–20)
BUN/CREAT SERPL: 20 (ref 12–20)
CALCIUM SERPL-MCNC: 8.6 MG/DL (ref 8.5–10.1)
CHLORIDE SERPL-SCNC: 107 MMOL/L (ref 97–108)
CO2 SERPL-SCNC: 28 MMOL/L (ref 21–32)
COMMENT, HOLDF: NORMAL
CREAT SERPL-MCNC: 1.02 MG/DL (ref 0.7–1.3)
GLUCOSE BLD STRIP.AUTO-MCNC: 104 MG/DL (ref 65–100)
GLUCOSE BLD STRIP.AUTO-MCNC: 109 MG/DL (ref 65–100)
GLUCOSE SERPL-MCNC: 116 MG/DL (ref 65–100)
POTASSIUM SERPL-SCNC: 3.6 MMOL/L (ref 3.5–5.1)
SAMPLES BEING HELD,HOLD: NORMAL
SERVICE CMNT-IMP: ABNORMAL
SERVICE CMNT-IMP: ABNORMAL
SODIUM SERPL-SCNC: 141 MMOL/L (ref 136–145)

## 2019-11-04 PROCEDURE — 74011250636 HC RX REV CODE- 250/636: Performed by: INTERNAL MEDICINE

## 2019-11-04 PROCEDURE — 77030020365 HC SOL INJ SOD CL 0.9% 50ML

## 2019-11-04 PROCEDURE — 82962 GLUCOSE BLOOD TEST: CPT

## 2019-11-04 PROCEDURE — 74011250637 HC RX REV CODE- 250/637: Performed by: HOSPITALIST

## 2019-11-04 PROCEDURE — 74011250637 HC RX REV CODE- 250/637: Performed by: UROLOGY

## 2019-11-04 PROCEDURE — 74011250636 HC RX REV CODE- 250/636: Performed by: HOSPITALIST

## 2019-11-04 PROCEDURE — C1751 CATH, INF, PER/CENT/MIDLINE: HCPCS

## 2019-11-04 PROCEDURE — 74011000258 HC RX REV CODE- 258: Performed by: INTERNAL MEDICINE

## 2019-11-04 PROCEDURE — 74011000258 HC RX REV CODE- 258: Performed by: HOSPITALIST

## 2019-11-04 PROCEDURE — 76937 US GUIDE VASCULAR ACCESS: CPT

## 2019-11-04 PROCEDURE — 05HB33Z INSERTION OF INFUSION DEVICE INTO RIGHT BASILIC VEIN, PERCUTANEOUS APPROACH: ICD-10-PCS | Performed by: INTERNAL MEDICINE

## 2019-11-04 RX ADMIN — ATORVASTATIN CALCIUM 20 MG: 20 TABLET, FILM COATED ORAL at 09:24

## 2019-11-04 RX ADMIN — MAGNESIUM OXIDE TAB 400 MG (241.3 MG ELEMENTAL MG) 400 MG: 400 (241.3 MG) TAB at 09:24

## 2019-11-04 RX ADMIN — MEROPENEM 500 MG: 500 INJECTION, POWDER, FOR SOLUTION INTRAVENOUS at 09:26

## 2019-11-04 RX ADMIN — LEVOTHYROXINE SODIUM 75 MCG: 75 TABLET ORAL at 07:01

## 2019-11-04 RX ADMIN — ERTAPENEM SODIUM 1 G: 1 INJECTION, POWDER, LYOPHILIZED, FOR SOLUTION INTRAMUSCULAR; INTRAVENOUS at 15:23

## 2019-11-04 RX ADMIN — IBUPROFEN 600 MG: 600 TABLET ORAL at 12:31

## 2019-11-04 RX ADMIN — MEROPENEM 500 MG: 500 INJECTION, POWDER, FOR SOLUTION INTRAVENOUS at 04:43

## 2019-11-04 RX ADMIN — Medication 2 CAPSULE: at 07:01

## 2019-11-04 NOTE — PROGRESS NOTES
After receiving first dose of Invanz, patient complaining of mild \"ringing in my ears\". RN spoke with Dr. Dorys Lopez, who stated that this is unrelated to medication and ok to proceed with patient discharge. Written and verbal discharge instructions reviewed with patient and caregiver. Patient confirmed understanding with adequate teach back. IV removed per protocol. Midline in place per order for antibiotic infusions at [facility]. Belongings with patient at time of discharge.

## 2019-11-04 NOTE — DISCHARGE SUMMARY
Hospitalist Discharge Summary     Patient ID:  Pierce Yañez  [id number]  24 y.o.  1960  10/31/2019    PCP on record: Margi Mccullough MD    Admit date: 10/31/2019  Discharge date and time: 11/4/2019    DISCHARGE DIAGNOSIS:    esbl ecoli bacteremia  only, Sepsis was ruled out     Type 2 DM:       Hyperlipidemia:       Hypothyroidism:       Hypokalemia:  UTI -A postoperative infection due to the prostate biopsy, no known prostatitis      Hypomagnesemia:    Sp prostate bx  A postoperative infection due to the prostate biopsy  CONSULTATIONS:  IP CONSULT TO UROLOGY  IP CONSULT TO INFECTIOUS DISEASES  IP CONSULT TO HOSPITALIST    Excerpted HPI from H&P of Parisa Luke MD:  63 y.o man with DM, hyperlipidemia, hypothyroidism, who presents with fever and body aches. Symptoms began around 4 PM today. He developed a fever of 101 F. He then felt aches and chills all over his body. He underwent a transrectal prostate biopsy on 10/30/19. He was prescribed ciprofloxacin and cefdinir post-procedure and has been taking them as instructed. He also took Tylenol with some subjective improvement of his fever. He now has dysuria, specifically a urethral burning sensation at the end of urination. He denies nausea, vomiting, cough, diarrhea. He does have a headache, no neck stiffness or photophobia.     ______________________________________________________________________  DISCHARGE SUMMARY/HOSPITAL COURSE:  for full details see H&P, daily progress notes, labs, consult notes. The pt was admitted to acute in medicine and placed on merrem. Blood cultures returned positive for esbl ecoli bacteremia. He was placed on merrem with resolution of symptoms. Repeat cultures were negative x 2 days. He is planned for discharge with ertapenem x 2 weeks.   He has fu with urology on Thursday.        _______________________________________________________________________  Patient seen and examined by me on discharge day.  Pertinent Findings:  Gen:    Not in distress  Chest: Clear lungs  CVS:   Regular rhythm. No edema  Abd:  Soft, not distended, not tender  Neuro:  Alert, nad  _______________________________________________________________________  DISCHARGE MEDICATIONS:   Current Discharge Medication List      START taking these medications    Details   ertapenem (INVANZ) 1 gram solr injection 1 g by IntraMUSCular route every twenty-four (24) hours for 14 days. Qty: 14 g, Refills: 0         CONTINUE these medications which have NOT CHANGED    Details   atorvastatin (LIPITOR) 20 mg tablet Take 20 mg by mouth daily. empagliflozin (JARDIANCE) 25 mg tablet Take 25 mg by mouth daily. indapamide (LOZOL) 2.5 mg tablet Take 2.5 mg by mouth daily. levothyroxine (SYNTHROID) 75 mcg tablet Take 75 mcg by mouth Daily (before breakfast). metFORMIN ER (GLUCOPHAGE XR) 500 mg tablet Take 1,000 mg by mouth two (2) times a day. tadalafil (CIALIS) 5 mg tablet Take 5-20 mg by mouth daily as needed. docosahexanoic acid/epa (FISH OIL PO) Take 2 Caps by mouth two (2) times a day. cholecalciferol (VITAMIN D3) (1000 Units /25 mcg) tablet Take 1,000 Units by mouth every evening. zinc 50 mg tab tablet Take 25 mg by mouth two (2) times a day. lutein 40 mg cap Take 40 mg by mouth nightly. acetaminophen (TYLENOL) 325 mg tablet Take 325 mg by mouth every four (4) hours as needed for Pain. Patient Follow Up Instructions: Activity: Activity as tolerated  Diet: Regular Diet  Wound Care: None needed    Follow-up with pcp in 1 week.   Follow-up tests/labs cbc, bmp mg phos  Follow-up Information    None       ________________________________________________________________    Risk of deterioration: High    Condition at Discharge:  Stable  __________________________________________________________________    Disposition  Home with family, no needs    ____________________________________________________________________    Code Status: Full Code  ___________________________________________________________________      Total time in minutes spent coordinating this discharge (includes going over instructions, follow-up, prescriptions, and preparing report for sign off to her PCP) :  31 minutes    Signed:  Bella Rodriguez DO

## 2019-11-04 NOTE — PROGRESS NOTES
BECKA:    Patient currently getting Midline placed. IV ABX orders sent to [facility] infusion center. Patient scheduled for first appointment tomorrow at 2pm.    Discharge later today. CM received ID orders from MD and contacted [facility] with referral.  CM faxed orders to [facility] at [address]. Odalys notified CM that appointment time available at [facility]  Infusion on 11/5 at 2pm.  AVS was updated.     Shawn Killian RN/CRM

## 2019-11-04 NOTE — PROGRESS NOTES
Call received from Lab reporting positive blood culture results from 10/31/19 E. Choli/ESBL in 1:4 bottles, Rt AC. RN notified Dr. Andrew Edwards. No new orders at this time.

## 2019-11-04 NOTE — PROGRESS NOTES
Courtesy Note    Patient feeling much better. Spoke with patient and his wife. Cultures show ESBL E coli as expected. Appreciate Dr. Brianna Reeves help along with hospitalist team. I'll see him back in the office later this week.

## 2019-11-04 NOTE — PROGRESS NOTES
ID Progress Note  2019    Subjective:     Afebrile. No dyspnea, abdominal pain, dysuria, nausea, vomiting, abdominal pain. ROS: No seizures, hemoptysis, hematochezia, hematemesis      Objective:     Vitals:   Visit Vitals  /85 (BP 1 Location: Left arm, BP Patient Position: At rest)   Pulse 70   Temp 98.2 °F (36.8 °C)   Resp 16   Ht 6' 1\" (1.854 m)   Wt 97.3 kg (214 lb 8 oz)   SpO2 97%   BMI 28.30 kg/m²        Tmax:  Temp (24hrs), Av.5 °F (36.9 °C), Min:98.2 °F (36.8 °C), Max:98.9 °F (37.2 °C)      Exam:    Not in distress  Eyes: pink conjunctivae, anicteric sclerae   Lungs: clear to auscultation, no rales, wheezes or rhonchi   Heart: s1, s2, no murmurs, rubs or clicks  No CVA tenderness, no gruber   Abdomen: soft, nontender, no guarding or rebound   Extremities: knees not warm or tender  Speech fluent     Labs:   Lab Results   Component Value Date/Time    WBC 9.5 2019 06:05 AM    HGB 19.1 (H) 2019 06:05 AM    HCT 55.6 (H) 2019 06:05 AM    PLATELET 491 (L)  06:05 AM    MCV 89.5 2019 06:05 AM     Lab Results   Component Value Date/Time    Sodium 134 (L) 2019 03:54 AM    Potassium 3.6 2019 03:54 AM    Chloride 105 2019 03:54 AM    CO2 20 (L) 2019 03:54 AM    Anion gap 9 2019 03:54 AM    Glucose 98 2019 03:54 AM    BUN 16 2019 03:54 AM    Creatinine 0.89 2019 03:54 AM    BUN/Creatinine ratio 18 2019 03:54 AM    GFR est AA >60 2019 03:54 AM    GFR est non-AA >60 2019 03:54 AM    Calcium 8.8 2019 03:54 AM    Bilirubin, total 0.8 10/31/2019 07:39 PM    AST (SGOT) 15 10/31/2019 07:39 PM    Alk. phosphatase 87 10/31/2019 07:39 PM    Protein, total 7.9 10/31/2019 07:39 PM    Albumin 4.1 10/31/2019 07:39 PM    Globulin 3.8 10/31/2019 07:39 PM    A-G Ratio 1.1 10/31/2019 07:39 PM    ALT (SGPT) 32 10/31/2019 07:39 PM             Assessment:        1. ESBL ecoli bacteremia likely related to prostate biopsy    2. ESBL UTI      3. Diabetes      4. Hyperlipidemia     5. hypothyrodism      Recommendations:     Can go home on ertapenem once daily until Nov 14. I will order midline and I will write infusion orders.      Donnie De Dios MD

## 2019-11-04 NOTE — PROGRESS NOTES
Bedside and Verbal shift change report given to Zayda BAUM (oncoming nurse) by Renee Goodwin (offgoing nurse). Report included the following information SBAR, Kardex, Procedure Summary, Intake/Output, MAR, Accordion, Recent Results and Med Rec Status.

## 2019-11-04 NOTE — PROCEDURES
Midline Insertion and Progress Note    PRE-PROCEDURE VERIFICATION  Correct Procedure: yes  Correct Site:  yes  Temperature: Temp: 98.2 °F (36.8 °C), Temperature Source: Temp Source: Oral  Recent Labs     11/03/19  0354 11/02/19  0605   BUN 16  --    CREA 0.89  --    PLT  --  102*   WBC  --  9.5     Allergies: Patient has no known allergies. PROCEDURE DETAIL  A single lumen midline IV catheter was started for Home IV Therapy. The following documentation is in addition to the Midline properties in the lines/airways flowsheet :  Lot #: [id number]  Catheter Total Length: 12 (cm)  External Catheter Length: 0 (cm)  Circumference: 32 (cm)  Vein Selection for Midline :right basilic  Complication Related to Insertion: none    Line is okay to use.

## 2019-11-05 ENCOUNTER — HOSPITAL ENCOUNTER (OUTPATIENT)
Dept: INFUSION THERAPY | Age: 59
Discharge: HOME | End: 2019-11-05
Payer: COMMERCIAL

## 2019-11-05 VITALS
TEMPERATURE: 98.2 F | OXYGEN SATURATION: 98 % | RESPIRATION RATE: 16 BRPM | HEART RATE: 85 BPM | SYSTOLIC BLOOD PRESSURE: 134 MMHG | DIASTOLIC BLOOD PRESSURE: 84 MMHG

## 2019-11-05 PROCEDURE — 74011250636 HC RX REV CODE- 250/636: Performed by: INTERNAL MEDICINE

## 2019-11-05 PROCEDURE — 74011000258 HC RX REV CODE- 258: Performed by: INTERNAL MEDICINE

## 2019-11-05 PROCEDURE — 96365 THER/PROPH/DIAG IV INF INIT: CPT

## 2019-11-05 RX ORDER — SODIUM CHLORIDE 0.9 % (FLUSH) 0.9 %
5-10 SYRINGE (ML) INJECTION AS NEEDED
Status: DISCONTINUED | OUTPATIENT
Start: 2019-11-05 | End: 2019-11-06 | Stop reason: HOSPADM

## 2019-11-05 RX ADMIN — Medication 10 ML: at 14:05

## 2019-11-05 RX ADMIN — ERTAPENEM SODIUM 1 G: 1 INJECTION, POWDER, LYOPHILIZED, FOR SOLUTION INTRAMUSCULAR; INTRAVENOUS at 14:09

## 2019-11-05 RX ADMIN — Medication 10 ML: at 14:36

## 2019-11-05 NOTE — PROGRESS NOTES
[address] [facility] @ [facility] VISIT NOTE     1445 Patient arrives for Daily Invanz Infusion (until 11/14) without acute problems. Please see connect care for complete assessment and education provided. Vital signs stable throughout and prior to discharge, Pt. Tolerated treatment well and discharged without incident. Patient is aware of next [facility] appointment on 11/06/2019. Appointment card given to patient. Medications Verified by Jenny Ravi RN & Lexy Villa RN via [employer]:  1. Invanz 1 gm  2.  NS flush via RUE SL Mid-Line PICC     VITAL SIGNS  Patient Vitals for the past 12 hrs:   Temp Pulse Resp BP SpO2   11/05/19 1439 98.2 °F (36.8 °C) 85 16 134/84    11/05/19 1404 98.2 °F (36.8 °C) 78 16 131/87 98 %

## 2019-11-06 ENCOUNTER — HOSPITAL ENCOUNTER (OUTPATIENT)
Dept: INFUSION THERAPY | Age: 59
Discharge: HOME | End: 2019-11-06
Payer: COMMERCIAL

## 2019-11-06 VITALS
SYSTOLIC BLOOD PRESSURE: 139 MMHG | DIASTOLIC BLOOD PRESSURE: 90 MMHG | TEMPERATURE: 97.7 F | HEART RATE: 67 BPM | RESPIRATION RATE: 16 BRPM

## 2019-11-06 LAB
BACTERIA SPEC CULT: ABNORMAL
BACTERIA SPEC CULT: ABNORMAL
SERVICE CMNT-IMP: ABNORMAL

## 2019-11-06 PROCEDURE — 96365 THER/PROPH/DIAG IV INF INIT: CPT

## 2019-11-06 PROCEDURE — 74011000258 HC RX REV CODE- 258: Performed by: INTERNAL MEDICINE

## 2019-11-06 PROCEDURE — 74011250636 HC RX REV CODE- 250/636: Performed by: INTERNAL MEDICINE

## 2019-11-06 RX ADMIN — SODIUM CHLORIDE 1 G: 900 INJECTION, SOLUTION INTRAVENOUS at 14:14

## 2019-11-06 NOTE — CDMP QUERY
Patient admitted with gram negative bacteremia, with documentation of sepsis. If possible, please document in progress notes and d/c summary if you are evaluating and/or treating any of the following: (Also please document suspected source of infection if known) 
 
=>E. Coli Bacteremia only, Sepsis was ruled out 
=>Sepsis, POA  
=>SIRS due to an infectous process without sepsis POA  
 =>Other, Please Specify 
=> Unable to determine The medical record reflects the following: 
 
   Risk Factors: Hx. Prostate biopsy on 10/30/19 Clinical Indicators: 10/.5,-126 in ED, WBC 11.1, lactic acid 1.24 Per H&P-Sepsis syndrome: (POA) likely due to acute prostatitis given the recent transrectal biopsy with fever and Leukocytosis WBC 12.2, 100.5, HR 86 Per Urology -Sepsis, BC -GNR 1/4 bottles, ? UTI 
11/1-Per ID-Gram negative bacteremia likely related to prostate biopsy GNR UTI  
11/2-WBC 9.5, Per Urology-ESBL bacteremia 11/4-Per dc summary-esbl ecoli bacteremia 
  
  Treatment: Levaquin IV, Vancomycin in ED,  IV NS bolus 1 Liters in ED, ID consult,  monitor vital signs, labwork, Vancomycin IV, Rocephin IV qday, Merrem 500 mg q6 hours,home with IVABX Please document in progress notes clinical indicators (such as the ones below) to support this diagnosis or if Sepsis may have been ruled out after study. 1. At least 2 SIRS Criteria (Systemic Inflammatory Response Syndrome) (CMS) 
-Temp > 100.9 or < 96.8 
-HR > 90 
-RR > 20  
-WBC > 12,000 or < 4,000 or > 10% bands 2. Documented suspected or confirmed source of infection. (CMS) 3. Documented Organ Dysfunction by any ONE of the following. (CMS) 
     
 the CMS guidelines 
-AMS (from baseline if known) -SBP<90 or MAP<65 
-Documentation of respiratory failure and use of either invasive mechanical ventilation (intubation) or non-invasive mechanical ventilation (CPAP/BIPAP) -Creat. > 2.0 (with no history of Chronic Kidney Disease) -Bilirubin > 2 mg / dl (with no history of liver disease) -PLT < 100,000 (with no history of thrombocytopenia) -INR > 1.5 or aPTT > 60 sec (for patients not on Warfarin therapy) -Lactic Acid > 2 mmol/ L Thank you, Isael DE LUNAN, RN 
CDI  
[phone]

## 2019-11-06 NOTE — CDMP QUERY
Patient admitted with gram negative bacteremia, noted to have documentation of acute prostatitis on the H&P. If possible, please clarify in progress notes and d/c summary the relationship, if any, between the acute prostatitis and the prostate biopsy of 10/30/19. Was the acute prostatitis: ? A postoperative infection due to the prostate biopsy ? Ruled out 
? Other, please specify ? Unable to determine Risk Factors: Hx. Prostate biopsy on 10/30/19 Clinical Indicators: 10/.5,-126 in ED, WBC 11.1, lactic acid 1.24 Per H&P-Sepsis syndrome: (POA) likely due to acute prostatitis given the recent transrectal biopsy with fever and Leukocytosis WBC 12.2, 100.5, HR 86 Per Urology -Sepsis, BC -GNR 1/4 bottles, ? UTI 
11/1-Per ID-Gram negative bacteremia likely related to prostate biopsy GNR UTI  
11/2-WBC 9.5, Per Urology-ESBL bacteremia 11/4-Per dc summary-esbl ecoli bacteremia Treatment: Levaquin IV, Vancomycin in ED,  IV NS bolus 1 Liters in ED, ID consult,  monitor vital signs, labwork, Vancomycin IV, Rocephin IV qday, Merrem 500 mg q6 hours,home with IVABX Thank you, Venessa Aguilar BSN, RN 
CDI  
[phone]

## 2019-11-06 NOTE — PROGRESS NOTES
[address] [facility] @ [facility] VISIT NOTE     1863 Patient arrives for Daily Invanz Infusion (until 11/14) without acute problems. Please see connect care for complete assessment and education provided. Vital signs stable throughout and prior to discharge, Pt. Tolerated treatment well and discharged without incident. Patient is aware of next [facility] appointment on 11/07/2019. Appointment card given to patient. Medications Verified by Chris Holloway RN & Denise Banda RN via [employer]:  1. Invanz 1 gm  2.  NS flush via RUE SL Mid-Line PICC     VITAL SIGNS  Patient Vitals for the past 12 hrs:   Temp Pulse Resp BP   11/06/19 1406 97.7 °F (36.5 °C) 67 16 139/90

## 2019-11-07 ENCOUNTER — HOSPITAL ENCOUNTER (OUTPATIENT)
Dept: INFUSION THERAPY | Age: 59
Discharge: HOME | End: 2019-11-07
Payer: COMMERCIAL

## 2019-11-07 VITALS
DIASTOLIC BLOOD PRESSURE: 76 MMHG | OXYGEN SATURATION: 97 % | RESPIRATION RATE: 16 BRPM | HEART RATE: 69 BPM | SYSTOLIC BLOOD PRESSURE: 113 MMHG | TEMPERATURE: 97.7 F

## 2019-11-07 LAB
ALBUMIN SERPL-MCNC: 3 G/DL (ref 3.5–5)
ALBUMIN/GLOB SERPL: 0.7 {RATIO} (ref 1.1–2.2)
ALP SERPL-CCNC: 154 U/L (ref 45–117)
ALT SERPL-CCNC: 71 U/L (ref 12–78)
ANION GAP SERPL CALC-SCNC: 5 MMOL/L (ref 5–15)
AST SERPL-CCNC: 22 U/L (ref 15–37)
BACTERIA SPEC CULT: NORMAL
BASOPHILS # BLD: 0.1 K/UL (ref 0–0.1)
BASOPHILS NFR BLD: 1 % (ref 0–1)
BILIRUB DIRECT SERPL-MCNC: 0.2 MG/DL (ref 0–0.2)
BILIRUB SERPL-MCNC: 0.7 MG/DL (ref 0.2–1)
BUN SERPL-MCNC: 20 MG/DL (ref 6–20)
BUN/CREAT SERPL: 20 (ref 12–20)
CALCIUM SERPL-MCNC: 9.3 MG/DL (ref 8.5–10.1)
CHLORIDE SERPL-SCNC: 105 MMOL/L (ref 97–108)
CO2 SERPL-SCNC: 28 MMOL/L (ref 21–32)
CREAT SERPL-MCNC: 1 MG/DL (ref 0.7–1.3)
DIFFERENTIAL METHOD BLD: ABNORMAL
EOSINOPHIL # BLD: 0.1 K/UL (ref 0–0.4)
EOSINOPHIL NFR BLD: 2 % (ref 0–7)
ERYTHROCYTE [DISTWIDTH] IN BLOOD BY AUTOMATED COUNT: 12.7 % (ref 11.5–14.5)
GLOBULIN SER CALC-MCNC: 4.4 G/DL (ref 2–4)
GLUCOSE SERPL-MCNC: 148 MG/DL (ref 65–100)
HCT VFR BLD AUTO: 56.6 % (ref 36.6–50.3)
HGB BLD-MCNC: 18.9 G/DL (ref 12.1–17)
IMM GRANULOCYTES # BLD AUTO: 0 K/UL (ref 0–0.04)
IMM GRANULOCYTES NFR BLD AUTO: 1 % (ref 0–0.5)
LYMPHOCYTES # BLD: 1.7 K/UL (ref 0.8–3.5)
LYMPHOCYTES NFR BLD: 31 % (ref 12–49)
MCH RBC QN AUTO: 29.6 PG (ref 26–34)
MCHC RBC AUTO-ENTMCNC: 33.4 G/DL (ref 30–36.5)
MCV RBC AUTO: 88.7 FL (ref 80–99)
MONOCYTES # BLD: 0.5 K/UL (ref 0–1)
MONOCYTES NFR BLD: 9 % (ref 5–13)
NEUTS SEG # BLD: 3.1 K/UL (ref 1.8–8)
NEUTS SEG NFR BLD: 56 % (ref 32–75)
NRBC # BLD: 0 K/UL (ref 0–0.01)
NRBC BLD-RTO: 0 PER 100 WBC
PLATELET # BLD AUTO: 205 K/UL (ref 150–400)
PMV BLD AUTO: 9.8 FL (ref 8.9–12.9)
POTASSIUM SERPL-SCNC: 3.6 MMOL/L (ref 3.5–5.1)
PROT SERPL-MCNC: 7.4 G/DL (ref 6.4–8.2)
RBC # BLD AUTO: 6.38 M/UL (ref 4.1–5.7)
SERVICE CMNT-IMP: NORMAL
SODIUM SERPL-SCNC: 138 MMOL/L (ref 136–145)
WBC # BLD AUTO: 5.5 K/UL (ref 4.1–11.1)

## 2019-11-07 PROCEDURE — 96365 THER/PROPH/DIAG IV INF INIT: CPT

## 2019-11-07 PROCEDURE — 85025 COMPLETE CBC W/AUTO DIFF WBC: CPT

## 2019-11-07 PROCEDURE — 74011250636 HC RX REV CODE- 250/636: Performed by: INTERNAL MEDICINE

## 2019-11-07 PROCEDURE — 80048 BASIC METABOLIC PNL TOTAL CA: CPT

## 2019-11-07 PROCEDURE — 80076 HEPATIC FUNCTION PANEL: CPT

## 2019-11-07 PROCEDURE — 74011000258 HC RX REV CODE- 258: Performed by: INTERNAL MEDICINE

## 2019-11-07 PROCEDURE — 36415 COLL VENOUS BLD VENIPUNCTURE: CPT

## 2019-11-07 RX ORDER — SODIUM CHLORIDE 0.9 % (FLUSH) 0.9 %
5-10 SYRINGE (ML) INJECTION AS NEEDED
Status: DISCONTINUED | OUTPATIENT
Start: 2019-11-07 | End: 2019-11-08 | Stop reason: HOSPADM

## 2019-11-07 RX ADMIN — Medication 10 ML: at 10:12

## 2019-11-07 RX ADMIN — ERTAPENEM SODIUM 1 G: 1 INJECTION, POWDER, LYOPHILIZED, FOR SOLUTION INTRAMUSCULAR; INTRAVENOUS at 10:10

## 2019-11-07 RX ADMIN — Medication 10 ML: at 10:40

## 2019-11-07 NOTE — PROGRESS NOTES
[address] [facility] @ [facility] VISIT NOTE    1004 Patient arrives for Invanz Daily until 11/14 without acute problems. Please see connect care for complete assessment and education provided. Vital signs stable throughout and prior to discharge, Pt. Tolerated treatment well and discharged without incident. Patient/parent is aware of next [facility] appointment on 11/8/2019. Appointment card given to patient/parents. Medications Verified by Bel Peña RN via [employer]:  1. Invanz 1 gram    VITAL SIGNS   Patient Vitals for the past 12 hrs:   Temp Pulse Resp BP SpO2   11/07/19 1040 97.7 °F (36.5 °C) 69 16 113/76    11/07/19 1005 97.4 °F (36.3 °C) 72 16 128/84 97 %       LAB WORK Lab results pending, please see Connect Care for results. Recent Results (from the past 12 hour(s))   CBC WITH AUTOMATED DIFF    Collection Time: 11/07/19 10:06 AM   Result Value Ref Range    WBC 5.5 4.1 - 11.1 K/uL    RBC 6.38 (H) 4.10 - 5.70 M/uL    HGB 18.9 (H) 12.1 - 17.0 g/dL    HCT 56.6 (H) 36.6 - 50.3 %    MCV 88.7 80.0 - 99.0 FL    MCH 29.6 26.0 - 34.0 PG    MCHC 33.4 30.0 - 36.5 g/dL    RDW 12.7 11.5 - 14.5 %    PLATELET 745 571 - 248 K/uL    MPV 9.8 8.9 - 12.9 FL    NRBC 0.0 0  WBC    ABSOLUTE NRBC 0.00 0.00 - 0.01 K/uL    NEUTROPHILS 56 32 - 75 %    LYMPHOCYTES 31 12 - 49 %    MONOCYTES 9 5 - 13 %    EOSINOPHILS 2 0 - 7 %    BASOPHILS 1 0 - 1 %    IMMATURE GRANULOCYTES 1 (H) 0.0 - 0.5 %    ABS. NEUTROPHILS 3.1 1.8 - 8.0 K/UL    ABS. LYMPHOCYTES 1.7 0.8 - 3.5 K/UL    ABS. MONOCYTES 0.5 0.0 - 1.0 K/UL    ABS. EOSINOPHILS 0.1 0.0 - 0.4 K/UL    ABS. BASOPHILS 0.1 0.0 - 0.1 K/UL    ABS. IMM.  GRANS. 0.0 0.00 - 0.04 K/UL    DF AUTOMATED     HEPATIC FUNCTION PANEL    Collection Time: 11/07/19 10:06 AM   Result Value Ref Range    Protein, total 7.4 6.4 - 8.2 g/dL    Albumin 3.0 (L) 3.5 - 5.0 g/dL    Globulin 4.4 (H) 2.0 - 4.0 g/dL    A-G Ratio 0.7 (L) 1.1 - 2.2      Bilirubin, total 0.7 0.2 - 1.0 MG/DL Bilirubin, direct 0.2 0.0 - 0.2 MG/DL    Alk.  phosphatase 154 (H) 45 - 117 U/L    AST (SGOT) 22 15 - 37 U/L    ALT (SGPT) 71 12 - 78 U/L   METABOLIC PANEL, BASIC    Collection Time: 11/07/19 10:06 AM   Result Value Ref Range    Sodium 138 136 - 145 mmol/L    Potassium 3.6 3.5 - 5.1 mmol/L    Chloride 105 97 - 108 mmol/L    CO2 28 21 - 32 mmol/L    Anion gap 5 5 - 15 mmol/L    Glucose 148 (H) 65 - 100 mg/dL    BUN 20 6 - 20 MG/DL    Creatinine 1.00 0.70 - 1.30 MG/DL    BUN/Creatinine ratio 20 12 - 20      GFR est AA >60 >60 ml/min/1.73m2    GFR est non-AA >60 >60 ml/min/1.73m2    Calcium 9.3 8.5 - 10.1 MG/DL

## 2019-11-08 ENCOUNTER — HOSPITAL ENCOUNTER (OUTPATIENT)
Dept: INFUSION THERAPY | Age: 59
Discharge: HOME | End: 2019-11-08
Payer: COMMERCIAL

## 2019-11-08 VITALS
HEART RATE: 69 BPM | DIASTOLIC BLOOD PRESSURE: 88 MMHG | TEMPERATURE: 97.9 F | OXYGEN SATURATION: 98 % | SYSTOLIC BLOOD PRESSURE: 123 MMHG | RESPIRATION RATE: 16 BRPM

## 2019-11-08 PROCEDURE — 74011000258 HC RX REV CODE- 258: Performed by: INTERNAL MEDICINE

## 2019-11-08 PROCEDURE — 96365 THER/PROPH/DIAG IV INF INIT: CPT

## 2019-11-08 PROCEDURE — 74011250636 HC RX REV CODE- 250/636: Performed by: INTERNAL MEDICINE

## 2019-11-08 RX ADMIN — ERTAPENEM SODIUM 1 G: 1 INJECTION, POWDER, LYOPHILIZED, FOR SOLUTION INTRAMUSCULAR; INTRAVENOUS at 10:07

## 2019-11-08 NOTE — PROGRESS NOTES
[facility] VISIT NOTE    [id number]. Patient arrives for Daily Invanz without acute problems. Please see connect care for complete assessment and education provided. All central lines follow the Pediatric [facility]. Vital signs stable throughout and prior to discharge, Pt. Tolerated treatment well and discharged without incident. Patient is aware of next hospitals appointment on 11/9/19 at 10:30 am at [facility]. Medications Verified by Sharan Rubio RN via XTRM:   1.  [address]   Patient Vitals for the past 12 hrs:   Temp Pulse Resp BP SpO2   11/08/19 0959 97.9 °F (36.6 °C) 74 16 127/85 98 %

## 2019-11-09 ENCOUNTER — HOSPITAL ENCOUNTER (OUTPATIENT)
Dept: INFUSION THERAPY | Age: 59
Discharge: HOME | End: 2019-11-09
Payer: COMMERCIAL

## 2019-11-09 VITALS
TEMPERATURE: 97.4 F | HEART RATE: 76 BPM | RESPIRATION RATE: 16 BRPM | SYSTOLIC BLOOD PRESSURE: 130 MMHG | DIASTOLIC BLOOD PRESSURE: 87 MMHG

## 2019-11-09 PROCEDURE — 74011250636 HC RX REV CODE- 250/636: Performed by: INTERNAL MEDICINE

## 2019-11-09 PROCEDURE — 96365 THER/PROPH/DIAG IV INF INIT: CPT

## 2019-11-09 PROCEDURE — 74011000258 HC RX REV CODE- 258: Performed by: INTERNAL MEDICINE

## 2019-11-09 RX ORDER — SODIUM CHLORIDE 0.9 % (FLUSH) 0.9 %
5-10 SYRINGE (ML) INJECTION AS NEEDED
Status: DISCONTINUED | OUTPATIENT
Start: 2019-11-09 | End: 2019-11-10 | Stop reason: HOSPADM

## 2019-11-09 RX ADMIN — ERTAPENEM SODIUM 1 G: 1 INJECTION, POWDER, LYOPHILIZED, FOR SOLUTION INTRAMUSCULAR; INTRAVENOUS at 10:53

## 2019-11-09 RX ADMIN — Medication 10 ML: at 10:46

## 2019-11-09 NOTE — PROGRESS NOTES
Outpatient Infusion Center Progress Note    3772 Pt admit to [facility] for [address] ambulatory in stable condition. Assessment completed. No new concerns voiced. Midline flushed and patent with no positive blood return. Visit Vitals  /87 (BP 1 Location: Left arm, BP Patient Position: At rest)   Pulse 76   Temp 97.4 °F (36.3 °C)   Resp 16       Medications:  Medications Administered     ertapenem (INVANZ) 1 g in 0.9% sodium chloride (MBP/ADV) 50 mL     Admin Date  11/09/2019 Action  New Bag Dose  1 g Rate  100 mL/hr Route  IntraVENous Administered By  Vj Maldonado, RN          sodium chloride (NS) flush 5-10 mL     Admin Date  11/09/2019 Action  Given Dose  10 mL Route  IntraVENous Administered By  Vj Maldonado, RN                  8324 Pt tolerated treatment well. D/c home ambulatory in no distress.  Pt aware of next appointment scheduled for   Future Appointments   Date Time Provider Ej Licea   11/10/2019 10:30 AM [facility]ISES  CHAIR 3 [facility]   11/11/2019 11:00 AM [facility] INFUSION NURSE 2 [facility]   11/12/2019 11:00 AM [facility] INFUSION NURSE 2 [facility]   11/13/2019 11:00 AM [facility] INFUSION NURSE 2 [facility]   11/14/2019 11:00 AM [facility] P[facility] INFUSION NURSE 2 [facility]

## 2019-11-10 ENCOUNTER — HOSPITAL ENCOUNTER (OUTPATIENT)
Dept: INFUSION THERAPY | Age: 59
Discharge: HOME | End: 2019-11-10
Payer: COMMERCIAL

## 2019-11-10 VITALS — TEMPERATURE: 97.5 F | HEART RATE: 72 BPM

## 2019-11-10 PROCEDURE — 74011000258 HC RX REV CODE- 258: Performed by: INTERNAL MEDICINE

## 2019-11-10 PROCEDURE — 74011250636 HC RX REV CODE- 250/636

## 2019-11-10 PROCEDURE — 96365 THER/PROPH/DIAG IV INF INIT: CPT

## 2019-11-10 PROCEDURE — 74011250636 HC RX REV CODE- 250/636: Performed by: INTERNAL MEDICINE

## 2019-11-10 RX ORDER — HEPARIN 100 UNIT/ML
500 SYRINGE INTRAVENOUS AS NEEDED
Status: DISCONTINUED | OUTPATIENT
Start: 2019-11-10 | End: 2019-11-11 | Stop reason: HOSPADM

## 2019-11-10 RX ORDER — SODIUM CHLORIDE 0.9 % (FLUSH) 0.9 %
5-10 SYRINGE (ML) INJECTION AS NEEDED
Status: DISCONTINUED | OUTPATIENT
Start: 2019-11-10 | End: 2019-11-11 | Stop reason: HOSPADM

## 2019-11-10 RX ADMIN — ERTAPENEM SODIUM 1 G: 1 INJECTION, POWDER, LYOPHILIZED, FOR SOLUTION INTRAMUSCULAR; INTRAVENOUS at 10:44

## 2019-11-10 RX ADMIN — SODIUM CHLORIDE, PRESERVATIVE FREE 500 UNITS: 5 INJECTION INTRAVENOUS at 10:00

## 2019-11-10 RX ADMIN — Medication 10 ML: at 10:48

## 2019-11-10 NOTE — PROGRESS NOTES
Outpatient Infusion Center Progress Note    0910 Pt admit to [facility] for daily Invanz ambulatory in stable condition. Assessment completed. No new concerns voiced. PICC line in place with C/D/I dressing dated 11/4/19. No blood return noted, patient states that it has never had blood return. Flushes smoothly with no signs of infiltration. Medications started per orders. Visit Vitals  Pulse 72   Temp 97.5 °F (36.4 °C)       Medications Administered     ertapenem (INVANZ) 1 g in 0.9% sodium chloride (MBP/ADV) 50 mL     Admin Date  11/10/2019 Action  New Bag Dose  1 g Rate  100 mL/hr Route  IntraVENous Administered By  Nakul Gonzalez          sodium chloride (NS) flush 5-10 mL     Admin Date  11/10/2019 Action  Given Dose  10 mL Route  IntraVENous Administered By  Nakul Gonzalez                1150 Pt tolerated treatment well. D/c home ambulatory in no distress.  Pt aware of next appointment scheduled for 11/11/2019    Danelle Eric RN

## 2019-11-11 ENCOUNTER — HOSPITAL ENCOUNTER (OUTPATIENT)
Dept: INFUSION THERAPY | Age: 59
Discharge: HOME | End: 2019-11-11
Payer: COMMERCIAL

## 2019-11-11 VITALS
DIASTOLIC BLOOD PRESSURE: 82 MMHG | OXYGEN SATURATION: 97 % | TEMPERATURE: 98.2 F | HEART RATE: 82 BPM | SYSTOLIC BLOOD PRESSURE: 123 MMHG | RESPIRATION RATE: 18 BRPM

## 2019-11-11 LAB
ALBUMIN SERPL-MCNC: 3.5 G/DL (ref 3.5–5)
ALBUMIN/GLOB SERPL: 0.8 {RATIO} (ref 1.1–2.2)
ALP SERPL-CCNC: 126 U/L (ref 45–117)
ALT SERPL-CCNC: 73 U/L (ref 12–78)
ANION GAP SERPL CALC-SCNC: 7 MMOL/L (ref 5–15)
AST SERPL-CCNC: 29 U/L (ref 15–37)
BASOPHILS # BLD: 0.1 K/UL (ref 0–0.1)
BASOPHILS NFR BLD: 1 % (ref 0–1)
BILIRUB DIRECT SERPL-MCNC: 0.2 MG/DL (ref 0–0.2)
BILIRUB SERPL-MCNC: 0.7 MG/DL (ref 0.2–1)
BUN SERPL-MCNC: 22 MG/DL (ref 6–20)
BUN/CREAT SERPL: 20 (ref 12–20)
CALCIUM SERPL-MCNC: 9.6 MG/DL (ref 8.5–10.1)
CHLORIDE SERPL-SCNC: 102 MMOL/L (ref 97–108)
CO2 SERPL-SCNC: 26 MMOL/L (ref 21–32)
CREAT SERPL-MCNC: 1.08 MG/DL (ref 0.7–1.3)
DIFFERENTIAL METHOD BLD: ABNORMAL
EOSINOPHIL # BLD: 0.2 K/UL (ref 0–0.4)
EOSINOPHIL NFR BLD: 3 % (ref 0–7)
ERYTHROCYTE [DISTWIDTH] IN BLOOD BY AUTOMATED COUNT: 12.8 % (ref 11.5–14.5)
GLOBULIN SER CALC-MCNC: 4.2 G/DL (ref 2–4)
GLUCOSE SERPL-MCNC: 175 MG/DL (ref 65–100)
HCT VFR BLD AUTO: 57.6 % (ref 36.6–50.3)
HGB BLD-MCNC: 19.5 G/DL (ref 12.1–17)
IMM GRANULOCYTES # BLD AUTO: 0 K/UL (ref 0–0.04)
IMM GRANULOCYTES NFR BLD AUTO: 1 % (ref 0–0.5)
LYMPHOCYTES # BLD: 1.9 K/UL (ref 0.8–3.5)
LYMPHOCYTES NFR BLD: 28 % (ref 12–49)
MCH RBC QN AUTO: 29.8 PG (ref 26–34)
MCHC RBC AUTO-ENTMCNC: 33.9 G/DL (ref 30–36.5)
MCV RBC AUTO: 87.9 FL (ref 80–99)
MONOCYTES # BLD: 0.4 K/UL (ref 0–1)
MONOCYTES NFR BLD: 6 % (ref 5–13)
NEUTS SEG # BLD: 4.1 K/UL (ref 1.8–8)
NEUTS SEG NFR BLD: 61 % (ref 32–75)
NRBC # BLD: 0 K/UL (ref 0–0.01)
NRBC BLD-RTO: 0 PER 100 WBC
PLATELET # BLD AUTO: 275 K/UL (ref 150–400)
PMV BLD AUTO: 9.1 FL (ref 8.9–12.9)
POTASSIUM SERPL-SCNC: 3.5 MMOL/L (ref 3.5–5.1)
PROT SERPL-MCNC: 7.7 G/DL (ref 6.4–8.2)
RBC # BLD AUTO: 6.55 M/UL (ref 4.1–5.7)
SODIUM SERPL-SCNC: 135 MMOL/L (ref 136–145)
WBC # BLD AUTO: 6.6 K/UL (ref 4.1–11.1)

## 2019-11-11 PROCEDURE — 74011000258 HC RX REV CODE- 258: Performed by: INTERNAL MEDICINE

## 2019-11-11 PROCEDURE — 96365 THER/PROPH/DIAG IV INF INIT: CPT

## 2019-11-11 PROCEDURE — 36415 COLL VENOUS BLD VENIPUNCTURE: CPT

## 2019-11-11 PROCEDURE — 80076 HEPATIC FUNCTION PANEL: CPT

## 2019-11-11 PROCEDURE — 77030020847 HC STATLOK BARD -A

## 2019-11-11 PROCEDURE — 80048 BASIC METABOLIC PNL TOTAL CA: CPT

## 2019-11-11 PROCEDURE — 85025 COMPLETE CBC W/AUTO DIFF WBC: CPT

## 2019-11-11 PROCEDURE — 74011250636 HC RX REV CODE- 250/636: Performed by: INTERNAL MEDICINE

## 2019-11-11 RX ORDER — SODIUM CHLORIDE 0.9 % (FLUSH) 0.9 %
5-10 SYRINGE (ML) INJECTION AS NEEDED
Status: DISCONTINUED | OUTPATIENT
Start: 2019-11-11 | End: 2019-11-12 | Stop reason: HOSPADM

## 2019-11-11 RX ADMIN — ERTAPENEM SODIUM 1 G: 1 INJECTION, POWDER, LYOPHILIZED, FOR SOLUTION INTRAMUSCULAR; INTRAVENOUS at 11:23

## 2019-11-11 RX ADMIN — Medication 10 ML: at 11:52

## 2019-11-11 RX ADMIN — Medication 10 ML: at 11:22

## 2019-11-11 NOTE — PROGRESS NOTES
[address] [facility] @ [facility] VISIT NOTE     1113 Patient arrives for Invanz Daily until 11/14 with Labs & RUE SL PICC line dressing change without acute problems. Please see connect care for complete assessment and education provided. Vital signs stable throughout and prior to discharge, Pt. Tolerated treatment well and discharged without incident. Patient is aware of next [facility] appointment on 11/12/2019. Appointment card given to patient/parents. Medications Verified by Ishmael Chandra RN via [employer]:  1. Invanz 1 gram    VITAL SIGNS  Patient Vitals for the past 12 hrs:   Temp Pulse Resp BP SpO2   11/11/19 1152 98.2 °F (36.8 °C) 82 18 123/82    11/11/19 1113 98.3 °F (36.8 °C) 85 18 122/81 97 %     LAB WORK  Recent Results (from the past 12 hour(s))   CBC WITH AUTOMATED DIFF    Collection Time: 11/11/19 11:27 AM   Result Value Ref Range    WBC 6.6 4.1 - 11.1 K/uL    RBC 6.55 (H) 4.10 - 5.70 M/uL    HGB 19.5 (H) 12.1 - 17.0 g/dL    HCT 57.6 (H) 36.6 - 50.3 %    MCV 87.9 80.0 - 99.0 FL    MCH 29.8 26.0 - 34.0 PG    MCHC 33.9 30.0 - 36.5 g/dL    RDW 12.8 11.5 - 14.5 %    PLATELET 979 785 - 792 K/uL    MPV 9.1 8.9 - 12.9 FL    NRBC 0.0 0  WBC    ABSOLUTE NRBC 0.00 0.00 - 0.01 K/uL    NEUTROPHILS 61 32 - 75 %    LYMPHOCYTES 28 12 - 49 %    MONOCYTES 6 5 - 13 %    EOSINOPHILS 3 0 - 7 %    BASOPHILS 1 0 - 1 %    IMMATURE GRANULOCYTES 1 (H) 0.0 - 0.5 %    ABS. NEUTROPHILS 4.1 1.8 - 8.0 K/UL    ABS. LYMPHOCYTES 1.9 0.8 - 3.5 K/UL    ABS. MONOCYTES 0.4 0.0 - 1.0 K/UL    ABS. EOSINOPHILS 0.2 0.0 - 0.4 K/UL    ABS. BASOPHILS 0.1 0.0 - 0.1 K/UL    ABS. IMM.  GRANS. 0.0 0.00 - 0.04 K/UL    DF AUTOMATED     HEPATIC FUNCTION PANEL    Collection Time: 11/11/19 11:27 AM   Result Value Ref Range    Protein, total 7.7 6.4 - 8.2 g/dL    Albumin 3.5 3.5 - 5.0 g/dL    Globulin 4.2 (H) 2.0 - 4.0 g/dL    A-G Ratio 0.8 (L) 1.1 - 2.2      Bilirubin, total 0.7 0.2 - 1.0 MG/DL    Bilirubin, direct 0.2 0.0 - 0.2 MG/DL    Alk.  phosphatase 126 (H) 45 - 117 U/L    AST (SGOT) 29 15 - 37 U/L    ALT (SGPT) 73 12 - 78 U/L   METABOLIC PANEL, BASIC    Collection Time: 11/11/19 11:27 AM   Result Value Ref Range    Sodium 135 (L) 136 - 145 mmol/L    Potassium 3.5 3.5 - 5.1 mmol/L    Chloride 102 97 - 108 mmol/L    CO2 26 21 - 32 mmol/L    Anion gap 7 5 - 15 mmol/L    Glucose 175 (H) 65 - 100 mg/dL    BUN 22 (H) 6 - 20 MG/DL    Creatinine 1.08 0.70 - 1.30 MG/DL    BUN/Creatinine ratio 20 12 - 20      GFR est AA >60 >60 ml/min/1.73m2    GFR est non-AA >60 >60 ml/min/1.73m2    Calcium 9.6 8.5 - 10.1 MG/DL

## 2019-11-12 ENCOUNTER — HOSPITAL ENCOUNTER (OUTPATIENT)
Dept: INFUSION THERAPY | Age: 59
Discharge: HOME | End: 2019-11-12
Payer: COMMERCIAL

## 2019-11-12 VITALS
HEART RATE: 81 BPM | DIASTOLIC BLOOD PRESSURE: 82 MMHG | OXYGEN SATURATION: 98 % | RESPIRATION RATE: 18 BRPM | TEMPERATURE: 98 F | SYSTOLIC BLOOD PRESSURE: 122 MMHG

## 2019-11-12 PROCEDURE — 74011250636 HC RX REV CODE- 250/636: Performed by: INTERNAL MEDICINE

## 2019-11-12 PROCEDURE — 96365 THER/PROPH/DIAG IV INF INIT: CPT

## 2019-11-12 PROCEDURE — 74011000258 HC RX REV CODE- 258: Performed by: INTERNAL MEDICINE

## 2019-11-12 RX ORDER — SODIUM CHLORIDE 0.9 % (FLUSH) 0.9 %
5-10 SYRINGE (ML) INJECTION AS NEEDED
Status: DISCONTINUED | OUTPATIENT
Start: 2019-11-12 | End: 2019-11-13 | Stop reason: HOSPADM

## 2019-11-12 RX ADMIN — Medication 10 ML: at 11:43

## 2019-11-12 RX ADMIN — Medication 10 ML: at 11:13

## 2019-11-12 RX ADMIN — ERTAPENEM SODIUM 1 G: 1 INJECTION, POWDER, LYOPHILIZED, FOR SOLUTION INTRAMUSCULAR; INTRAVENOUS at 11:14

## 2019-11-12 NOTE — PROGRESS NOTES
[address] [facility] @ [facility] VISIT NOTE     1100 Patient arrives for Invanz Daily until 11/14 without acute problems. Please see connect care for complete assessment and education provided. Vital signs stable throughout and prior to discharge, Pt. Tolerated treatment well and discharged without incident. Patient is aware of next [facility] appointment on 11/13/2019. Appointment card given to patient. Medications Verified by Jeremy Diallo RN via [employer]:  1.  Invanz 1 gm     VITAL SIGNS  Patient Vitals for the past 12 hrs:   Temp Pulse Resp BP SpO2   11/12/19 1144 98 °F (36.7 °C) 81 18 122/82    11/12/19 1105 97.8 °F (36.6 °C) 86 18 123/87 98 %

## 2019-11-13 ENCOUNTER — HOSPITAL ENCOUNTER (OUTPATIENT)
Dept: INFUSION THERAPY | Age: 59
Discharge: HOME | End: 2019-11-13
Payer: COMMERCIAL

## 2019-11-13 VITALS
DIASTOLIC BLOOD PRESSURE: 89 MMHG | TEMPERATURE: 97.6 F | SYSTOLIC BLOOD PRESSURE: 143 MMHG | RESPIRATION RATE: 18 BRPM | HEART RATE: 78 BPM

## 2019-11-13 PROCEDURE — 96365 THER/PROPH/DIAG IV INF INIT: CPT

## 2019-11-13 PROCEDURE — 74011000258 HC RX REV CODE- 258: Performed by: INTERNAL MEDICINE

## 2019-11-13 PROCEDURE — 74011250636 HC RX REV CODE- 250/636: Performed by: INTERNAL MEDICINE

## 2019-11-13 RX ORDER — SODIUM CHLORIDE 0.9 % (FLUSH) 0.9 %
5-10 SYRINGE (ML) INJECTION AS NEEDED
Status: DISCONTINUED | OUTPATIENT
Start: 2019-11-13 | End: 2019-11-14 | Stop reason: HOSPADM

## 2019-11-13 RX ADMIN — Medication 10 ML: at 11:43

## 2019-11-13 RX ADMIN — Medication 10 ML: at 11:10

## 2019-11-13 RX ADMIN — Medication 10 ML: at 11:42

## 2019-11-13 RX ADMIN — ERTAPENEM SODIUM 1 G: 1 INJECTION, POWDER, LYOPHILIZED, FOR SOLUTION INTRAMUSCULAR; INTRAVENOUS at 11:12

## 2019-11-13 NOTE — PROGRESS NOTES
[address] [facility] @ [facility] VISIT NOTE     Patient arrives for Antibiotic regimen without acute problems. Please see connect care for complete assessment and education provided. All central lines follow the [facility]. Vital signs stable throughout and prior to discharge, Pt. Tolerated treatment well and discharged without incident. Patient/parent is aware of next [facility] appointment on 11/14/2019. Appointment card given to patient/parents. Medications Verified by Kindra Celis RN via [employer]:   1.  Ertapenem IV      VITAL SIGNS   Patient Vitals for the past 12 hrs:   Temp Pulse Resp BP   11/13/19 1105 97.6 °F (36.4 °C) 78 18 143/89

## 2019-11-14 ENCOUNTER — HOSPITAL ENCOUNTER (OUTPATIENT)
Dept: INFUSION THERAPY | Age: 59
Discharge: HOME | End: 2019-11-14
Payer: COMMERCIAL

## 2019-11-14 VITALS
TEMPERATURE: 98.4 F | HEART RATE: 78 BPM | OXYGEN SATURATION: 99 % | SYSTOLIC BLOOD PRESSURE: 141 MMHG | DIASTOLIC BLOOD PRESSURE: 89 MMHG | RESPIRATION RATE: 16 BRPM

## 2019-11-14 LAB
ALBUMIN SERPL-MCNC: 3.8 G/DL (ref 3.5–5)
ALBUMIN/GLOB SERPL: 0.9 {RATIO} (ref 1.1–2.2)
ALP SERPL-CCNC: 121 U/L (ref 45–117)
ALT SERPL-CCNC: 77 U/L (ref 12–78)
ANION GAP SERPL CALC-SCNC: 5 MMOL/L (ref 5–15)
AST SERPL-CCNC: 26 U/L (ref 15–37)
BASOPHILS # BLD: 0.1 K/UL (ref 0–0.1)
BASOPHILS NFR BLD: 1 % (ref 0–1)
BILIRUB DIRECT SERPL-MCNC: 0.2 MG/DL (ref 0–0.2)
BILIRUB SERPL-MCNC: 0.6 MG/DL (ref 0.2–1)
BUN SERPL-MCNC: 24 MG/DL (ref 6–20)
BUN/CREAT SERPL: 24 (ref 12–20)
CALCIUM SERPL-MCNC: 9.9 MG/DL (ref 8.5–10.1)
CHLORIDE SERPL-SCNC: 103 MMOL/L (ref 97–108)
CO2 SERPL-SCNC: 28 MMOL/L (ref 21–32)
CREAT SERPL-MCNC: 1.02 MG/DL (ref 0.7–1.3)
DIFFERENTIAL METHOD BLD: ABNORMAL
EOSINOPHIL # BLD: 0.2 K/UL (ref 0–0.4)
EOSINOPHIL NFR BLD: 2 % (ref 0–7)
ERYTHROCYTE [DISTWIDTH] IN BLOOD BY AUTOMATED COUNT: 12.9 % (ref 11.5–14.5)
GLOBULIN SER CALC-MCNC: 4.2 G/DL (ref 2–4)
GLUCOSE SERPL-MCNC: 104 MG/DL (ref 65–100)
HCT VFR BLD AUTO: 58.6 % (ref 36.6–50.3)
HGB BLD-MCNC: 19.9 G/DL (ref 12.1–17)
IMM GRANULOCYTES # BLD AUTO: 0 K/UL (ref 0–0.04)
IMM GRANULOCYTES NFR BLD AUTO: 1 % (ref 0–0.5)
LYMPHOCYTES # BLD: 2.3 K/UL (ref 0.8–3.5)
LYMPHOCYTES NFR BLD: 32 % (ref 12–49)
MCH RBC QN AUTO: 29.9 PG (ref 26–34)
MCHC RBC AUTO-ENTMCNC: 34 G/DL (ref 30–36.5)
MCV RBC AUTO: 88 FL (ref 80–99)
MONOCYTES # BLD: 0.4 K/UL (ref 0–1)
MONOCYTES NFR BLD: 6 % (ref 5–13)
NEUTS SEG # BLD: 4.1 K/UL (ref 1.8–8)
NEUTS SEG NFR BLD: 58 % (ref 32–75)
NRBC # BLD: 0 K/UL (ref 0–0.01)
NRBC BLD-RTO: 0 PER 100 WBC
PLATELET # BLD AUTO: 254 K/UL (ref 150–400)
PMV BLD AUTO: 9.4 FL (ref 8.9–12.9)
POTASSIUM SERPL-SCNC: 3.6 MMOL/L (ref 3.5–5.1)
PROT SERPL-MCNC: 8 G/DL (ref 6.4–8.2)
RBC # BLD AUTO: 6.66 M/UL (ref 4.1–5.7)
SODIUM SERPL-SCNC: 136 MMOL/L (ref 136–145)
WBC # BLD AUTO: 7 K/UL (ref 4.1–11.1)

## 2019-11-14 PROCEDURE — 80048 BASIC METABOLIC PNL TOTAL CA: CPT

## 2019-11-14 PROCEDURE — 80076 HEPATIC FUNCTION PANEL: CPT

## 2019-11-14 PROCEDURE — 74011000258 HC RX REV CODE- 258: Performed by: INTERNAL MEDICINE

## 2019-11-14 PROCEDURE — 74011000250 HC RX REV CODE- 250: Performed by: INTERNAL MEDICINE

## 2019-11-14 PROCEDURE — 36415 COLL VENOUS BLD VENIPUNCTURE: CPT

## 2019-11-14 PROCEDURE — 96365 THER/PROPH/DIAG IV INF INIT: CPT

## 2019-11-14 PROCEDURE — 85025 COMPLETE CBC W/AUTO DIFF WBC: CPT

## 2019-11-14 PROCEDURE — 74011250636 HC RX REV CODE- 250/636: Performed by: INTERNAL MEDICINE

## 2019-11-14 RX ORDER — SODIUM CHLORIDE 0.9 % (FLUSH) 0.9 %
5-10 SYRINGE (ML) INJECTION AS NEEDED
Status: DISCONTINUED | OUTPATIENT
Start: 2019-11-14 | End: 2019-11-15 | Stop reason: HOSPADM

## 2019-11-14 RX ORDER — BACITRACIN 500 UNIT/G
1 PACKET (EA) TOPICAL
Status: COMPLETED | OUTPATIENT
Start: 2019-11-14 | End: 2019-11-14

## 2019-11-14 RX ADMIN — ERTAPENEM SODIUM 1 G: 1 INJECTION, POWDER, LYOPHILIZED, FOR SOLUTION INTRAMUSCULAR; INTRAVENOUS at 10:57

## 2019-11-14 RX ADMIN — Medication 10 ML: at 11:28

## 2019-11-14 RX ADMIN — Medication 10 ML: at 10:55

## 2019-11-14 RX ADMIN — BACITRACIN 1 PACKET: 500 OINTMENT TOPICAL at 11:30

## 2019-11-14 NOTE — PROGRESS NOTES
[address] [facility] @ [facility] VISIT NOTE     5372 Patient arrives for Invanz Daily until 11/14 without acute problems. Please see connect care for complete assessment and education provided. Vital signs stable throughout and prior to discharge, Pt. Tolerated treatment well and discharged without incident. Patient is aware of no further [facility] appointments @ this time & to follow up with his PCP @ appointment on Monday 11/18/19 as previously instructed. Patients RUE SL Midline PICC removed, as ordered, per [facility] policy without difficulty. Pressure held to site x 5 minutes with Bacitracin ointment applied to PICC line removal site & covered with gauze & transparent dressing. Patient instructed to keep dressing intact x 24 hours. Patient kept for 30 minutes observation with no adverse reactions noted. Medications Verified by Gómez Helton RN & Linda Royal RN via [employer]:  1. Invanz 1 gm      VITAL SIGNS  Patient Vitals for the past 12 hrs:   Temp Pulse Resp BP SpO2   11/14/19 1203 98.4 °F (36.9 °C) 78 16 141/89    11/14/19 1130 98.2 °F (36.8 °C) 74 16 (!) 131/91    11/14/19 1057 97.9 °F (36.6 °C) 70 16 (!) 138/91 99 %     LAB WORK  Recent Results (from the past 12 hour(s))   CBC WITH AUTOMATED DIFF    Collection Time: 11/14/19 10:57 AM   Result Value Ref Range    WBC 7.0 4.1 - 11.1 K/uL    RBC 6.66 (H) 4.10 - 5.70 M/uL    HGB 19.9 (H) 12.1 - 17.0 g/dL    HCT 58.6 (H) 36.6 - 50.3 %    MCV 88.0 80.0 - 99.0 FL    MCH 29.9 26.0 - 34.0 PG    MCHC 34.0 30.0 - 36.5 g/dL    RDW 12.9 11.5 - 14.5 %    PLATELET 580 312 - 195 K/uL    MPV 9.4 8.9 - 12.9 FL    NRBC 0.0 0  WBC    ABSOLUTE NRBC 0.00 0.00 - 0.01 K/uL    NEUTROPHILS 58 32 - 75 %    LYMPHOCYTES 32 12 - 49 %    MONOCYTES 6 5 - 13 %    EOSINOPHILS 2 0 - 7 %    BASOPHILS 1 0 - 1 %    IMMATURE GRANULOCYTES 1 (H) 0.0 - 0.5 %    ABS. NEUTROPHILS 4.1 1.8 - 8.0 K/UL    ABS. LYMPHOCYTES 2.3 0.8 - 3.5 K/UL    ABS.  MONOCYTES 0.4 0.0 - 1.0 K/UL    ABS. EOSINOPHILS 0.2 0.0 - 0.4 K/UL    ABS. BASOPHILS 0.1 0.0 - 0.1 K/UL    ABS. IMM. GRANS. 0.0 0.00 - 0.04 K/UL    DF AUTOMATED     METABOLIC PANEL, BASIC    Collection Time: 11/14/19 10:57 AM   Result Value Ref Range    Sodium 136 136 - 145 mmol/L    Potassium 3.6 3.5 - 5.1 mmol/L    Chloride 103 97 - 108 mmol/L    CO2 28 21 - 32 mmol/L    Anion gap 5 5 - 15 mmol/L    Glucose 104 (H) 65 - 100 mg/dL    BUN 24 (H) 6 - 20 MG/DL    Creatinine 1.02 0.70 - 1.30 MG/DL    BUN/Creatinine ratio 24 (H) 12 - 20      GFR est AA >60 >60 ml/min/1.73m2    GFR est non-AA >60 >60 ml/min/1.73m2    Calcium 9.9 8.5 - 10.1 MG/DL   HEPATIC FUNCTION PANEL    Collection Time: 11/14/19 10:57 AM   Result Value Ref Range    Protein, total 8.0 6.4 - 8.2 g/dL    Albumin 3.8 3.5 - 5.0 g/dL    Globulin 4.2 (H) 2.0 - 4.0 g/dL    A-G Ratio 0.9 (L) 1.1 - 2.2      Bilirubin, total 0.6 0.2 - 1.0 MG/DL    Bilirubin, direct 0.2 0.0 - 0.2 MG/DL    Alk.  phosphatase 121 (H) 45 - 117 U/L    AST (SGOT) 26 15 - 37 U/L    ALT (SGPT) 77 12 - 78 U/L

## 2019-11-23 ENCOUNTER — HOSPITAL ENCOUNTER (EMERGENCY)
Age: 59
Discharge: HOME | End: 2019-11-23
Attending: EMERGENCY MEDICINE
Payer: COMMERCIAL

## 2019-11-23 VITALS
SYSTOLIC BLOOD PRESSURE: 132 MMHG | HEART RATE: 86 BPM | RESPIRATION RATE: 16 BRPM | OXYGEN SATURATION: 97 % | TEMPERATURE: 99 F | DIASTOLIC BLOOD PRESSURE: 88 MMHG

## 2019-11-23 DIAGNOSIS — N30.00 ACUTE CYSTITIS WITHOUT HEMATURIA: Primary | ICD-10-CM

## 2019-11-23 LAB
ALBUMIN SERPL-MCNC: 3.5 G/DL (ref 3.5–5)
ALBUMIN/GLOB SERPL: 0.8 {RATIO} (ref 1.1–2.2)
ALP SERPL-CCNC: 121 U/L (ref 45–117)
ALT SERPL-CCNC: 32 U/L (ref 12–78)
ANION GAP SERPL CALC-SCNC: 5 MMOL/L (ref 5–15)
APPEARANCE UR: CLEAR
AST SERPL-CCNC: 13 U/L (ref 15–37)
BACTERIA URNS QL MICRO: NEGATIVE /HPF
BASOPHILS # BLD: 0 K/UL (ref 0–0.1)
BASOPHILS NFR BLD: 1 % (ref 0–1)
BILIRUB SERPL-MCNC: 0.8 MG/DL (ref 0.2–1)
BILIRUB UR QL: NEGATIVE
BUN SERPL-MCNC: 22 MG/DL (ref 6–20)
BUN/CREAT SERPL: 20 (ref 12–20)
CALCIUM SERPL-MCNC: 9.5 MG/DL (ref 8.5–10.1)
CHLORIDE SERPL-SCNC: 102 MMOL/L (ref 97–108)
CO2 SERPL-SCNC: 30 MMOL/L (ref 21–32)
COLOR UR: ABNORMAL
COMMENT, HOLDF: NORMAL
CREAT SERPL-MCNC: 1.12 MG/DL (ref 0.7–1.3)
DIFFERENTIAL METHOD BLD: ABNORMAL
EOSINOPHIL # BLD: 0 K/UL (ref 0–0.4)
EOSINOPHIL NFR BLD: 1 % (ref 0–7)
EPITH CASTS URNS QL MICRO: ABNORMAL /LPF
ERYTHROCYTE [DISTWIDTH] IN BLOOD BY AUTOMATED COUNT: 12.6 % (ref 11.5–14.5)
GLOBULIN SER CALC-MCNC: 4.4 G/DL (ref 2–4)
GLUCOSE SERPL-MCNC: 138 MG/DL (ref 65–100)
GLUCOSE UR STRIP.AUTO-MCNC: >1000 MG/DL
HCT VFR BLD AUTO: 56.2 % (ref 36.6–50.3)
HGB BLD-MCNC: 18.7 G/DL (ref 12.1–17)
HGB UR QL STRIP: ABNORMAL
HYALINE CASTS URNS QL MICRO: ABNORMAL /LPF (ref 0–5)
IMM GRANULOCYTES # BLD AUTO: 0 K/UL (ref 0–0.04)
IMM GRANULOCYTES NFR BLD AUTO: 0 % (ref 0–0.5)
KETONES UR QL STRIP.AUTO: NEGATIVE MG/DL
LACTATE BLD-SCNC: 0.85 MMOL/L (ref 0.4–2)
LEUKOCYTE ESTERASE UR QL STRIP.AUTO: ABNORMAL
LYMPHOCYTES # BLD: 0.9 K/UL (ref 0.8–3.5)
LYMPHOCYTES NFR BLD: 16 % (ref 12–49)
MCH RBC QN AUTO: 29.7 PG (ref 26–34)
MCHC RBC AUTO-ENTMCNC: 33.3 G/DL (ref 30–36.5)
MCV RBC AUTO: 89.3 FL (ref 80–99)
MONOCYTES # BLD: 0.6 K/UL (ref 0–1)
MONOCYTES NFR BLD: 10 % (ref 5–13)
NEUTS SEG # BLD: 4.2 K/UL (ref 1.8–8)
NEUTS SEG NFR BLD: 72 % (ref 32–75)
NITRITE UR QL STRIP.AUTO: NEGATIVE
NRBC # BLD: 0 K/UL (ref 0–0.01)
NRBC BLD-RTO: 0 PER 100 WBC
PH UR STRIP: 6.5 [PH] (ref 5–8)
PLATELET # BLD AUTO: 138 K/UL (ref 150–400)
PMV BLD AUTO: 10.1 FL (ref 8.9–12.9)
POTASSIUM SERPL-SCNC: 3.6 MMOL/L (ref 3.5–5.1)
PROT SERPL-MCNC: 7.9 G/DL (ref 6.4–8.2)
PROT UR STRIP-MCNC: NEGATIVE MG/DL
RBC # BLD AUTO: 6.29 M/UL (ref 4.1–5.7)
RBC #/AREA URNS HPF: >100 /HPF (ref 0–5)
SAMPLES BEING HELD,HOLD: NORMAL
SODIUM SERPL-SCNC: 137 MMOL/L (ref 136–145)
SP GR UR REFRACTOMETRY: 1.03 (ref 1–1.03)
UR CULT HOLD, URHOLD: NORMAL
UROBILINOGEN UR QL STRIP.AUTO: 0.2 EU/DL (ref 0.2–1)
WBC # BLD AUTO: 5.8 K/UL (ref 4.1–11.1)
WBC URNS QL MICRO: >100 /HPF (ref 0–4)

## 2019-11-23 PROCEDURE — 87186 SC STD MICRODIL/AGAR DIL: CPT

## 2019-11-23 PROCEDURE — 99284 EMERGENCY DEPT VISIT MOD MDM: CPT

## 2019-11-23 PROCEDURE — 74011250636 HC RX REV CODE- 250/636: Performed by: EMERGENCY MEDICINE

## 2019-11-23 PROCEDURE — 96365 THER/PROPH/DIAG IV INF INIT: CPT

## 2019-11-23 PROCEDURE — 36415 COLL VENOUS BLD VENIPUNCTURE: CPT

## 2019-11-23 PROCEDURE — 85025 COMPLETE CBC W/AUTO DIFF WBC: CPT

## 2019-11-23 PROCEDURE — 81001 URINALYSIS AUTO W/SCOPE: CPT

## 2019-11-23 PROCEDURE — 87040 BLOOD CULTURE FOR BACTERIA: CPT

## 2019-11-23 PROCEDURE — 80053 COMPREHEN METABOLIC PANEL: CPT

## 2019-11-23 PROCEDURE — 87077 CULTURE AEROBIC IDENTIFY: CPT

## 2019-11-23 PROCEDURE — 74011000258 HC RX REV CODE- 258: Performed by: EMERGENCY MEDICINE

## 2019-11-23 PROCEDURE — 83605 ASSAY OF LACTIC ACID: CPT

## 2019-11-23 PROCEDURE — 87086 URINE CULTURE/COLONY COUNT: CPT

## 2019-11-23 RX ORDER — PHENAZOPYRIDINE HYDROCHLORIDE 200 MG/1
200 TABLET, FILM COATED ORAL 3 TIMES DAILY
Qty: 6 TAB | Refills: 0 | Status: SHIPPED | OUTPATIENT
Start: 2019-11-23 | End: 2019-11-25

## 2019-11-23 RX ORDER — CEPHALEXIN 500 MG/1
500 CAPSULE ORAL 3 TIMES DAILY
Qty: 21 CAP | Refills: 0 | Status: SHIPPED | OUTPATIENT
Start: 2019-11-23 | End: 2019-11-30

## 2019-11-23 RX ADMIN — SODIUM CHLORIDE 1000 ML: 900 INJECTION, SOLUTION INTRAVENOUS at 12:06

## 2019-11-23 RX ADMIN — CEFTRIAXONE 1 G: 1 INJECTION, POWDER, FOR SOLUTION INTRAMUSCULAR; INTRAVENOUS at 12:50

## 2019-11-23 NOTE — ED PROVIDER NOTES
28-year-old  male presents to the emergency department with dysuria and urinary frequency. Patient reports that he was admitted to the hospital 2 weeks ago with a bladder infection. This occurred after a prostate biopsy at the end of October. He ended up having bacteremia. He was discharged on ertapenem IV which she just finished several days ago. He reports 2 or 3 days of urinary frequency and urinary urgency with dysuria. He denies fevers. He did have a temperature of 99.82 days ago. He denies vomiting. No abdominal pain. He overall does not feel poorly. He denies chest pain or shortness of breath. No headaches. He says he saw his primary doctor 2 days ago for the symptoms. They did urinalysis and blood work which she does not have the results of. Past Medical History:   Diagnosis Date    Diabetes ([facility] [address]     Elevated PSA     Migraines        Past Surgical History:   Procedure Laterality Date    HX HERNIA REPAIR      HX SEPTOPLASTY           No family history on file.     Social History     Socioeconomic History    Marital status:      Spouse name: Not on file    Number of children: Not on file    Years of education: Not on file    Highest education level: Not on file   Occupational History    Not on file   Social Needs    Financial resource strain: Not on file    Food insecurity:     Worry: Not on file     Inability: Not on file    Transportation needs:     Medical: Not on file     Non-medical: Not on file   Tobacco Use    Smoking status: Never Smoker    Smokeless tobacco: Never Used   Substance and Sexual Activity    Alcohol use: Yes     Comment: Social    Drug use: Not Currently    Sexual activity: Not on file   Lifestyle    Physical activity:     Days per week: Not on file     Minutes per session: Not on file    Stress: Not on file   Relationships    Social connections:     Talks on phone: Not on file     Gets together: Not on file     Attends Buddhist service: Not on file     Active member of club or organization: Not on file     Attends meetings of clubs or organizations: Not on file     Relationship status: Not on file    Intimate partner violence:     Fear of current or ex partner: Not on file     Emotionally abused: Not on file     Physically abused: Not on file     Forced sexual activity: Not on file   Other Topics Concern    Not on file   Social History Narrative    Not on file         ALLERGIES: Patient has no known allergies. Review of Systems   Constitutional: Negative for fever. HENT: Negative for congestion. Eyes: Negative for pain. Respiratory: Negative for cough and shortness of breath. Cardiovascular: Negative for chest pain. Gastrointestinal: Negative for abdominal pain. Endocrine: Negative for polyuria. Genitourinary: Positive for difficulty urinating, dysuria, frequency and urgency. Negative for flank pain. Musculoskeletal: Negative for neck pain. Skin: Negative for color change. Allergic/Immunologic: Negative for immunocompromised state. Neurological: Negative for headaches. Hematological: Does not bruise/bleed easily. Psychiatric/Behavioral: Negative for confusion. All other systems reviewed and are negative. There were no vitals filed for this visit. Physical Exam  Vitals signs and nursing note reviewed. Constitutional:       General: He is not in acute distress. Appearance: He is well-developed. He is not diaphoretic. HENT:      Head: Normocephalic and atraumatic. Right Ear: External ear normal.      Left Ear: External ear normal.   Eyes:      Pupils: Pupils are equal, round, and reactive to light. Neck:      Musculoskeletal: Normal range of motion and neck supple. Vascular: No JVD. Trachea: No tracheal deviation. Cardiovascular:      Rate and Rhythm: Normal rate and regular rhythm. Heart sounds: Normal heart sounds. No murmur. No friction rub. No gallop. Pulmonary:      Effort: Pulmonary effort is normal. No respiratory distress. Breath sounds: Normal breath sounds. No stridor. No wheezing or rales. Abdominal:      General: Bowel sounds are normal. There is no distension. Palpations: Abdomen is soft. Tenderness: There is no tenderness. There is no guarding or rebound. Musculoskeletal: Normal range of motion. General: No tenderness. Skin:     General: Skin is warm and dry. Findings: No erythema or rash. Neurological:      Mental Status: He is alert and oriented to person, place, and time. Cranial Nerves: No cranial nerve deficit. Coordination: Coordination normal.      Deep Tendon Reflexes: Reflexes are normal and symmetric. Psychiatric:         Behavior: Behavior normal.         Thought Content: Thought content normal.         Judgment: Judgment normal.          MDM  Number of Diagnoses or Management Options  Diagnosis management comments: Patient looks well and nontoxic. Will check blood work, lactate, blood cultures, urinalysis. Will give IV fluids. Will reassess when testing is completed. Patient agrees. Amount and/or Complexity of Data Reviewed  Clinical lab tests: ordered and reviewed  Tests in the medicine section of CPT®: ordered and reviewed  Decide to obtain previous medical records or to obtain history from someone other than the patient: yes  Review and summarize past medical records: yes  Independent visualization of images, tracings, or specimens: yes           Procedures    12:40 PM  UA shows UTI  Lactate is reassuring  WBC is normal  Will give Rocephin and add urine culture    Will discharge w/ Keflex and PCP f/u  Urine culture ordered  Pt looks well and agrees w/ discharge    Good return precautions given to patient. Close follow up with PCP recommended. Patient and/or family voices understanding of this plan. Discharge instructions were explained by me and all concerns were addressed.

## 2019-11-23 NOTE — DISCHARGE INSTRUCTIONS
Patient Education        Urinary Tract Infections in Men: Care Instructions  Your Care Instructions    A urinary tract infection, or UTI, is a general term for an infection anywhere between the kidneys and the tip of the penis. UTIs can also be a result of a prostate problem. Most cause pain or burning when you urinate. Most UTIs are caused by bacteria and can be cured with antibiotics. It is important to complete your treatment so that the infection does not get worse. Follow-up care is a key part of your treatment and safety. Be sure to make and go to all appointments, and call your doctor if you are having problems. It's also a good idea to know your test results and keep a list of the medicines you take. How can you care for yourself at home? · Take your antibiotics as prescribed. Do not stop taking them just because you feel better. You need to take the full course of antibiotics. · Take your medicines exactly as prescribed. Your doctor may have prescribed a medicine, such as phenazopyridine (Pyridium), to help relieve pain when you urinate. This turns your urine orange. You may stop taking it when your symptoms get better. But be sure to take all of your antibiotics, which treat the infection. · Drink extra water for the next day or two. This will help make the urine less concentrated and help wash out the bacteria causing the infection. (If you have kidney, heart, or liver disease and have to limit your fluids, talk with your doctor before you increase your fluid intake.)  · Avoid drinks that are carbonated or have caffeine. They can irritate the bladder. · Urinate often. Try to empty your bladder each time. · To relieve pain, take a hot bath or lay a heating pad (set on low) over your lower belly or genital area. Never go to sleep with a heating pad in place. To help prevent UTIs  · Drink plenty of fluids, enough so that your urine is light yellow or clear like water.  If you have kidney, heart, or liver disease and have to limit fluids, talk with your doctor before you increase the amount of fluids you drink. · Urinate when you have the urge. Do not hold your urine for a long time. Urinate before you go to sleep. · Keep your penis clean. Catheter care  If you have a drainage tube (catheter) in place, the following steps will help you care for it. · Always wash your hands before and after touching your catheter. · Check the area around the urethra for inflammation or signs of infection. Signs of infection include irritated, swollen, red, or tender skin, or pus around the catheter. · Clean the area around the catheter with soap and water two times a day. Dry with a clean towel afterward. · Do not apply powder or lotion to the skin around the catheter. To empty the urine collection bag  · Wash your hands with soap and water. · Without touching the drain spout, remove the spout from its sleeve at the bottom of the collection bag. Open the valve on the spout. · Let the urine flow out of the bag and into the toilet or a container. Do not let the tubing or drain spout touch anything. · After you empty the bag, clean the end of the drain spout with tissue and water. Close the valve and put the drain spout back into its sleeve at the bottom of the collection bag. · Wash your hands with soap and water. When should you call for help? Call your doctor now or seek immediate medical care if:    · Symptoms such as a fever, chills, nausea, or vomiting get worse or happen for the first time.     · You have new pain in your back just below your rib cage. This is called flank pain.     · There is new blood or pus in your urine.     · You are not able to take or keep down your antibiotics.    Watch closely for changes in your health, and be sure to contact your doctor if:    · You are not getting better after taking an antibiotic for 2 days.     · Your symptoms go away but then come back.    Where can you learn more?  Go to http://brynn-nadia.info/. Enter X902 in the search box to learn more about \"Urinary Tract Infections in Men: Care Instructions. \"  Current as of: December 19, 2018  Content Version: 12.2  © 9487-0765 [employer]. Care instructions adapted under license by [employer] (which disclaims liability or warranty for this information). If you have questions about a medical condition or this instruction, always ask your healthcare professional. [place] [address] any warranty or liability for your use of this information.

## 2019-11-23 NOTE — ED TRIAGE NOTES
Patient reports to ED with urinary symptoms since Wednesday. Patient reports urinary frequency and pain. Patient reports fever of 99.8 Thursday.

## 2019-11-25 LAB
BACTERIA SPEC CULT: ABNORMAL
CC UR VC: ABNORMAL
SERVICE CMNT-IMP: ABNORMAL

## 2019-11-25 RX ORDER — NITROFURANTOIN 25; 75 MG/1; MG/1
100 CAPSULE ORAL 2 TIMES DAILY
Qty: 14 CAP | Refills: 0 | Status: SHIPPED | OUTPATIENT
Start: 2019-11-25 | End: 2019-12-02

## 2019-11-25 NOTE — PROGRESS NOTES
I called and spoke with patient concerning result of urine.   Informed to stop keflex  ERX for macrobid 100mg bid x 7 d to [facility]

## 2019-11-28 LAB
BACTERIA SPEC CULT: NORMAL
SERVICE CMNT-IMP: NORMAL

## 2022-04-20 NOTE — CDMP QUERY
Patient admitted with gram negative bacteremia, noted to have documentation of GNR UTI on consult. If possible, please clarify in progress notes and d/c summary the relationship, if any, between theUTI and the prostate biopsy of 10/30/19. Was the UTI:  
? A postoperative infection due to the prostate biopsy ? Ruled out 
? Other, please specify ? Unable to determine Risk Factors: Hx. Prostate biopsy on 10/30/19 Clinical Indicators: 10/.5,-126 in ED, WBC 11.1, lactic acid 1.24, UA-negative bacteria, urine culture E coli 30,000 colonies Per H&P-Sepsis syndrome: (POA) likely due to acute prostatitis given the recent transrectal biopsy with fever and Leukocytosis WBC 12.2, 100.5, HR 86 Per Urology -Sepsis, BC -GNR 1/4 bottles, ? UTI 
11/1-Per ID-Gram negative bacteremia likely related to prostate biopsy GNR UTI  
11/2-WBC 9.5, Per Urology-ESBL bacteremia 11/4-Per dc summary-esbl ecoli bacteremia Treatment: Levaquin IV, Vancomycin in ED,  IV NS bolus 1 Liters in ED, ID consult,  monitor vital signs, labwork, Vancomycin IV, Rocephin IV qday, Merrem 500 mg q6 hours,home with IVABX Thank you, Deborah Ortiz BSN, RN 
[facility]  
[phone] Dutasteride Pregnancy And Lactation Text: This medication is absolutely contraindicated in women, especially during pregnancy and breast feeding. Feminization of male fetuses is possible if taking while pregnant.